# Patient Record
Sex: MALE | Race: WHITE | Employment: FULL TIME | URBAN - METROPOLITAN AREA
[De-identification: names, ages, dates, MRNs, and addresses within clinical notes are randomized per-mention and may not be internally consistent; named-entity substitution may affect disease eponyms.]

---

## 2022-01-08 ENCOUNTER — HOSPITAL ENCOUNTER (EMERGENCY)
Age: 37
Discharge: HOME | End: 2022-01-08
Payer: COMMERCIAL

## 2022-01-08 VITALS
DIASTOLIC BLOOD PRESSURE: 86 MMHG | SYSTOLIC BLOOD PRESSURE: 139 MMHG | RESPIRATION RATE: 16 BRPM | HEIGHT: 71 IN | HEART RATE: 87 BPM | OXYGEN SATURATION: 97 % | TEMPERATURE: 98.5 F | BODY MASS INDEX: 33.6 KG/M2 | WEIGHT: 240 LBS

## 2022-01-08 DIAGNOSIS — J06.9 ACUTE UPPER RESPIRATORY INFECTION: Primary | ICD-10-CM

## 2022-01-08 PROCEDURE — U0005 INFEC AGEN DETEC AMPLI PROBE: HCPCS

## 2022-01-08 PROCEDURE — U0003 INFECTIOUS AGENT DETECTION BY NUCLEIC ACID (DNA OR RNA); SEVERE ACUTE RESPIRATORY SYNDROME CORONAVIRUS 2 (SARS-COV-2) (CORONAVIRUS DISEASE [COVID-19]), AMPLIFIED PROBE TECHNIQUE, MAKING USE OF HIGH THROUGHPUT TECHNOLOGIES AS DESCRIBED BY CMS-2020-01-R: HCPCS

## 2022-01-08 PROCEDURE — 99283 EMERGENCY DEPT VISIT LOW MDM: CPT

## 2022-01-08 RX ORDER — GUAIFENESIN, PSEUDOEPHEDRINE HYDROCHLORIDE 600; 60 MG/1; MG/1
1 TABLET, EXTENDED RELEASE ORAL EVERY 12 HOURS
Qty: 14 TABLET | Refills: 0 | Status: SHIPPED | OUTPATIENT
Start: 2022-01-08 | End: 2022-01-15

## 2022-01-08 RX ORDER — BENZONATATE 200 MG/1
200 CAPSULE ORAL 3 TIMES DAILY PRN
Qty: 30 CAPSULE | Refills: 0 | Status: SHIPPED | OUTPATIENT
Start: 2022-01-08 | End: 2022-01-15

## 2022-01-08 RX ORDER — ETODOLAC 400 MG/1
400 TABLET, FILM COATED ORAL 2 TIMES DAILY
Qty: 14 TABLET | Refills: 0 | Status: SHIPPED | OUTPATIENT
Start: 2022-01-08 | End: 2022-02-04

## 2022-01-08 ASSESSMENT — ENCOUNTER SYMPTOMS
SHORTNESS OF BREATH: 0
ABDOMINAL PAIN: 0
COLOR CHANGE: 0
EYE PAIN: 0
SORE THROAT: 1
TROUBLE SWALLOWING: 0
APNEA: 0
COUGH: 1
ALLERGIC/IMMUNOLOGIC NEGATIVE: 1

## 2022-01-08 NOTE — ED PROVIDER NOTES
[address] [facility] ED  eMERGENCYdEPARTMENT eNCOUnter      Pt Name: Rebecca Smiley  MRN: [id number]  [place] 1985  Date of evaluation: 1/8/2022  Provider:Sam Villegas PA-C    CHIEF COMPLAINT       Chief Complaint   Patient presents with    Concern For COVID-19     pt c/o a cough, congestion, and fatigue for the past five days         HISTORY OF PRESENT ILLNESS  (Location/Symptom, Timing/Onset, Context/Setting, Quality, Duration, Modifying Factors, Severity.)   Rebecca Smiley is a 39 y.o. male who presents to the emergency department with a  5 day history of generalized malaise and fatigue, headache, myalgias, cough, congestion and sore throat. Subjective fever and chills but has not checked temperature at home. States multiple recent exposures to covid positive persons. HPI    Nursing Notes were reviewed and I agree. REVIEW OF SYSTEMS    (2-9 systems for level 4, 10 or more for level 5)     Review of Systems   Constitutional: Positive for chills, fatigue and fever. Negative for diaphoresis. HENT: Positive for congestion and sore throat. Negative for hearing loss and trouble swallowing. Eyes: Negative for pain. Respiratory: Positive for cough. Negative for apnea and shortness of breath. Cardiovascular: Negative for chest pain. Gastrointestinal: Negative for abdominal pain. Endocrine: Negative. Genitourinary: Negative for hematuria. Musculoskeletal: Positive for myalgias. Negative for neck pain and neck stiffness. Skin: Negative for color change. Allergic/Immunologic: Negative. Neurological: Positive for headaches. Negative for dizziness and numbness. Hematological: Negative. Psychiatric/Behavioral: Negative. All other systems reviewed and are negative. Except as noted above the remainder of the review of systems was reviewed and negative. PAST MEDICAL HISTORY   History reviewed. No pertinent past medical history. SURGICAL HISTORY     History reviewed. No pertinent surgical history. CURRENT MEDICATIONS       Previous Medications    No medications on file       ALLERGIES     Patient has no known allergies. FAMILY HISTORY     History reviewed. No pertinent family history. SOCIAL HISTORY       Social History     Socioeconomic History    Marital status: Single     Spouse name: None    Number of children: None    Years of education: None    Highest education level: None   Occupational History    None   Tobacco Use    Smoking status: Current Every Day Smoker    Smokeless tobacco: Never Used   Substance and Sexual Activity    Alcohol use: Not Currently    Drug use: Yes     Types: Marijuana Aly Free)    Sexual activity: None   Other Topics Concern    None   Social History Narrative    None     Social Determinants of Health     Financial Resource Strain:     Difficulty of Paying Living Expenses: Not on file   Food Insecurity:     Worried About Running Out of Food in the Last Year: Not on file    Michael of Food in the Last Year: Not on file   Transportation Needs:     Lack of Transportation (Medical): Not on file    Lack of Transportation (Non-Medical):  Not on file   Physical Activity:     Days of Exercise per Week: Not on file    Minutes of Exercise per Session: Not on file   Stress:     Feeling of Stress : Not on file   Social Connections:     Frequency of Communication with Friends and Family: Not on file    Frequency of Social Gatherings with Friends and Family: Not on file    Attends Hoahaoism Services: Not on file    Active Member of Clubs or Organizations: Not on file    Attends Club or Organization Meetings: Not on file    Marital Status: Not on file   Intimate Partner Violence:     Fear of Current or Ex-Partner: Not on file    Emotionally Abused: Not on file    Physically Abused: Not on file    Sexually Abused: Not on file   Housing Stability:     Unable to Pay for Housing in the Last Year: Not on file    Number of Places Lived in the Last Year: Not on file    Unstable Housing in the Last Year: Not on file       SCREENINGS           PHYSICAL EXAM    (up to 7 forlevel 4, 8 or more for level 5)     ED Triage Vitals [01/08/22 1509]   BP Temp Temp Source Pulse Resp SpO2 Height Weight   139/86 98.5 °F (36.9 °C) Oral 87 16 97 % 5' 11\" (1.803 m) 240 lb (108.9 kg)       Physical Exam  Vitals and nursing note reviewed. Constitutional:       General: He is not in acute distress. Appearance: He is well-developed. He is not diaphoretic. HENT:      Head: Normocephalic and atraumatic. Mouth/Throat:      Pharynx: No oropharyngeal exudate. Eyes:      General: No scleral icterus. Conjunctiva/sclera: Conjunctivae normal.      Pupils: Pupils are equal, round, and reactive to light. Neck:      Trachea: No tracheal deviation. Cardiovascular:      Rate and Rhythm: Normal rate. Heart sounds: Normal heart sounds. Pulmonary:      Effort: Pulmonary effort is normal. No respiratory distress. Breath sounds: Normal breath sounds. Abdominal:      General: Bowel sounds are normal. There is no distension. Palpations: Abdomen is soft. Musculoskeletal:         General: Normal range of motion. Cervical back: Normal range of motion and neck supple. Skin:     General: Skin is warm and dry. Findings: No erythema or rash. Neurological:      Mental Status: He is alert and oriented to person, place, and time. Cranial Nerves: No cranial nerve deficit. Motor: No abnormal muscle tone. Psychiatric:         Behavior: Behavior normal.         Thought Content:  Thought content normal.         Judgment: Judgment normal.           DIAGNOSTIC RESULTS     RADIOLOGY:   Non-plain film images such as CT, Ultrasound and MRI are read by the radiologist. Plain radiographic images are visualized and preliminarilyinterpreted by Maria Teresa Jacobs PA-C with the below findings:        Interpretation per the Radiologist below, if available at the time of this note:    No orders to display       LABS:  Labs Reviewed   COVID-19   COVID-19       All other labs were within normal range or not returnedas of this dictation. EMERGENCYDEPARTMENT COURSE and DIFFERENTIAL DIAGNOSIS/MDM:   Vitals:    Vitals:    01/08/22 1509   BP: 139/86   Pulse: 87   Resp: 16   Temp: 98.5 °F (36.9 °C)   TempSrc: Oral   SpO2: 97%   Weight: 240 lb (108.9 kg)   Height: 5' 11\" (1.803 m)       REASSESSMENT        Patient presents with a 5 day history of URI symptoms. COVID testing pending. Patient will be discharged with supportive therapy, quarantine instructions and reasons to return to the ER    MDM    PROCEDURES:    Procedures      FINAL IMPRESSION      1.  Acute upper respiratory infection          DISPOSITION/PLAN   DISPOSITION Decision To Discharge 01/08/2022 03:20:38 PM      PATIENT REFERRED TO:  [facility] and [address] [place], [place] [address]Nora Shook  [phone]  Call in 2 days        DISCHARGE MEDICATIONS:  New Prescriptions    BENZONATATE (TESSALON) 200 MG CAPSULE    Take 1 capsule by mouth 3 times daily as needed for Cough    ETODOLAC (LODINE) 400 MG TABLET    Take 1 tablet by mouth 2 times daily    PSEUDOEPHEDRINE-GUAIFENESIN (MUCINEX D)  MG PER EXTENDED RELEASE TABLET    Take 1 tablet by mouth every 12 hours for 7 days       (Please note that portions of this note were completed with a voice recognition program.  Efforts were made to edit the dictations but occasionally words are mis-transcribed.)    KEIRY Ford PA-C  01/08/22 3567

## 2022-01-08 NOTE — ED TRIAGE NOTES
Pt c/o a cough, congestion, and fatigue for the past five days, Pt is A&OX3, calm, ambulatory, afebrile, breathes are equal and unlabored,

## 2022-01-08 NOTE — Clinical Note
Patt Gaviria was seen and treated in our emergency department on 1/8/2022. COVID19 virus is suspected. Per the CDC guidelines we recommend home isolation until the following conditions are all met:    1. At least 10 days have passed since symptoms first appeared and  2. At least 24 hours have passed since last fever without the use of fever-reducing medications and  3. Symptoms (e.g., cough, shortness of breath) have improved    If you have any questions or concerns, please don't hesitate to call.     He may return to work/school on 01/14/2022        Thu Hopson PA-C

## 2022-01-08 NOTE — Clinical Note
Rebecca Smiley was seen and treated in our emergency department on 1/8/2022. COVID19 virus is suspected. Per the CDC guidelines we recommend home isolation until the following conditions are all met:    1. At least 10 days have passed since symptoms first appeared and  2. At least 24 hours have passed since last fever without the use of fever-reducing medications and  3. Symptoms (e.g., cough, shortness of breath) have improved    If you have any questions or concerns, please don't hesitate to call.     He may return to work/school on 01/14/2022        Arie Winters PA-C

## 2022-01-09 LAB
SARS-COV-2: NOT DETECTED
SOURCE: NORMAL

## 2022-02-04 ENCOUNTER — HOSPITAL ENCOUNTER (EMERGENCY)
Age: 37
Discharge: HOME | End: 2022-02-04
Attending: EMERGENCY MEDICINE
Payer: COMMERCIAL

## 2022-02-04 ENCOUNTER — APPOINTMENT (OUTPATIENT)
Dept: CT IMAGING | Age: 37
End: 2022-02-04
Payer: COMMERCIAL

## 2022-02-04 VITALS
BODY MASS INDEX: 33.6 KG/M2 | RESPIRATION RATE: 18 BRPM | HEART RATE: 92 BPM | OXYGEN SATURATION: 98 % | SYSTOLIC BLOOD PRESSURE: 142 MMHG | HEIGHT: 71 IN | DIASTOLIC BLOOD PRESSURE: 91 MMHG | WEIGHT: 240 LBS | TEMPERATURE: 98.4 F

## 2022-02-04 DIAGNOSIS — S20.219A CONTUSION OF CHEST WALL, UNSPECIFIED LATERALITY, INITIAL ENCOUNTER: Primary | ICD-10-CM

## 2022-02-04 LAB
EKG ATRIAL RATE: 89 BPM
EKG P AXIS: 54 DEGREES
EKG P-R INTERVAL: 144 MS
EKG Q-T INTERVAL: 372 MS
EKG QRS DURATION: 96 MS
EKG QTC CALCULATION (BAZETT): 452 MS
EKG R AXIS: 36 DEGREES
EKG T AXIS: 49 DEGREES
EKG VENTRICULAR RATE: 89 BPM

## 2022-02-04 PROCEDURE — 99284 EMERGENCY DEPT VISIT MOD MDM: CPT

## 2022-02-04 PROCEDURE — 71250 CT THORAX DX C-: CPT

## 2022-02-04 PROCEDURE — 96372 THER/PROPH/DIAG INJ SC/IM: CPT

## 2022-02-04 PROCEDURE — 93005 ELECTROCARDIOGRAM TRACING: CPT | Performed by: PHYSICIAN ASSISTANT

## 2022-02-04 PROCEDURE — 6360000002 HC RX W HCPCS: Performed by: PHYSICIAN ASSISTANT

## 2022-02-04 PROCEDURE — 93010 ELECTROCARDIOGRAM REPORT: CPT | Performed by: INTERNAL MEDICINE

## 2022-02-04 RX ORDER — ETODOLAC 400 MG/1
400 TABLET, FILM COATED ORAL 2 TIMES DAILY
Qty: 14 TABLET | Refills: 0 | Status: SHIPPED | OUTPATIENT
Start: 2022-02-04 | End: 2022-02-26 | Stop reason: ALTCHOICE

## 2022-02-04 RX ORDER — CYCLOBENZAPRINE HCL 10 MG
10 TABLET ORAL 3 TIMES DAILY PRN
Qty: 12 TABLET | Refills: 0 | Status: SHIPPED | OUTPATIENT
Start: 2022-02-04 | End: 2022-02-14

## 2022-02-04 RX ORDER — KETOROLAC TROMETHAMINE 30 MG/ML
60 INJECTION, SOLUTION INTRAMUSCULAR; INTRAVENOUS ONCE
Status: COMPLETED | OUTPATIENT
Start: 2022-02-04 | End: 2022-02-04

## 2022-02-04 RX ORDER — ALPRAZOLAM 2 MG/1
2 TABLET ORAL 3 TIMES DAILY PRN
COMMUNITY

## 2022-02-04 RX ADMIN — KETOROLAC TROMETHAMINE 60 MG: 30 INJECTION, SOLUTION INTRAMUSCULAR at 16:11

## 2022-02-04 ASSESSMENT — ENCOUNTER SYMPTOMS
ALLERGIC/IMMUNOLOGIC NEGATIVE: 1
COLOR CHANGE: 0
EYE PAIN: 0
TROUBLE SWALLOWING: 0
APNEA: 0
ABDOMINAL PAIN: 0
SHORTNESS OF BREATH: 1

## 2022-02-04 ASSESSMENT — PAIN DESCRIPTION - DESCRIPTORS: DESCRIPTORS: SHARP

## 2022-02-04 ASSESSMENT — PAIN DESCRIPTION - FREQUENCY: FREQUENCY: CONTINUOUS

## 2022-02-04 ASSESSMENT — PAIN DESCRIPTION - LOCATION: LOCATION: CHEST

## 2022-02-04 ASSESSMENT — PAIN SCALES - GENERAL
PAINLEVEL_OUTOF10: 7
PAINLEVEL_OUTOF10: 8

## 2022-02-04 ASSESSMENT — PAIN DESCRIPTION - PAIN TYPE: TYPE: ACUTE PAIN

## 2022-02-04 ASSESSMENT — PAIN DESCRIPTION - ORIENTATION: ORIENTATION: LEFT

## 2022-02-04 NOTE — ED TRIAGE NOTES
Pt fell and slipped on ice and braced self with left arm to chest. C/o chest pain. No LOC. Denies hitting head.  No thinners

## 2022-02-04 NOTE — ED NOTES
Pt provided with discharge instructions, f/u care instructions, and RX x2. Medication education completed. Pt verbalizes understanding; denies questions. Pt ambulatory off unit in stable condition. Family to drive pt home.      Shlomo Sanford RN  02/04/22 [id number]

## 2022-02-04 NOTE — Clinical Note
Janki Asencio was seen and treated in our emergency department on 2/4/2022. He may return to work on 02/06/2022. If you have any questions or concerns, please don't hesitate to call.       Marley Hickman PA-C

## 2022-02-04 NOTE — ED PROVIDER NOTES
[address] ED  EMERGENCY DEPARTMENT ENCOUNTER      Pt Name: Shira Logan  MRN: [id number]  [place] 1985  Date of evaluation: 2/4/2022  Provider: Luis Elias PA-C    CHIEF COMPLAINT       Chief Complaint   Patient presents with    Fall         HISTORY OF PRESENT ILLNESS   (Location/Symptom, Timing/Onset, Context/Setting, Quality, Duration, Modifying Factors, Severity)  Note limiting factors. Shira Logan is a 39 y.o. male who presents to the emergency department with complaints of midsternal chest pain into the left side of the chest with associated shortness of breath. Pain is pleuritic in nature. Patient states that he had slipped on ice yesterday, and had his hand braced against his chest when he fell forward, pushing his hand into his chest wall. Patient now states that he is having persisting pain which increases every time he takes a deep breath and causes him to feel short of breath as well. Patient denies any his head or any loss consciousness. Patient denies any neck pain, back pain, saddle paresthesias or loss of bowel bladder control. No abdominal pain, nausea or vomiting. No hemoptysis. HPI    Nursing Notes were reviewed. REVIEW OF SYSTEMS    (2-9 systems for level 4, 10 or more for level 5)     Review of Systems   Constitutional: Negative for diaphoresis and fever. HENT: Negative for hearing loss and trouble swallowing. Eyes: Negative for pain. Respiratory: Positive for shortness of breath. Negative for apnea. Cardiovascular: Positive for chest pain. Gastrointestinal: Negative for abdominal pain. Endocrine: Negative. Genitourinary: Negative for hematuria. Musculoskeletal: Negative for neck pain and neck stiffness. Skin: Negative for color change. Allergic/Immunologic: Negative. Neurological: Negative for dizziness and numbness. Hematological: Negative. Psychiatric/Behavioral: Negative.     All other systems reviewed and are negative. Except as noted above the remainder of the review of systems was reviewed and negative. PAST MEDICAL HISTORY   History reviewed. No pertinent past medical history. SURGICAL HISTORY     History reviewed. No pertinent surgical history. CURRENT MEDICATIONS       Previous Medications    ALPRAZOLAM (XANAX) 2 MG TABLET    Take 2 mg by mouth 3 times daily as needed for Sleep. ALLERGIES     Patient has no known allergies. FAMILY HISTORY     History reviewed. No pertinent family history. SOCIAL HISTORY       Social History     Socioeconomic History    Marital status: Single     Spouse name: None    Number of children: None    Years of education: None    Highest education level: None   Occupational History    None   Tobacco Use    Smoking status: Current Every Day Smoker    Smokeless tobacco: Never Used   Substance and Sexual Activity    Alcohol use: Not Currently    Drug use: Yes     Types: Marijuana Aly Free)    Sexual activity: None   Other Topics Concern    None   Social History Narrative    None     Social Determinants of Health     Financial Resource Strain:     Difficulty of Paying Living Expenses: Not on file   Food Insecurity:     Worried About Running Out of Food in the Last Year: Not on file    Michael of Food in the Last Year: Not on file   Transportation Needs:     Lack of Transportation (Medical): Not on file    Lack of Transportation (Non-Medical):  Not on file   Physical Activity:     Days of Exercise per Week: Not on file    Minutes of Exercise per Session: Not on file   Stress:     Feeling of Stress : Not on file   Social Connections:     Frequency of Communication with Friends and Family: Not on file    Frequency of Social Gatherings with Friends and Family: Not on file    Attends Latter day Services: Not on file    Active Member of Clubs or Organizations: Not on file    Attends Club or Organization Meetings: Not on file    Marital Status: Not on file   Intimate Partner Violence:     Fear of Current or Ex-Partner: Not on file    Emotionally Abused: Not on file    Physically Abused: Not on file    Sexually Abused: Not on file   Housing Stability:     Unable to Pay for Housing in the Last Year: Not on file    Number of [place] in the Last Year: Not on file    Unstable Housing in the Last Year: Not on file       SCREENINGS                        PHYSICAL EXAM    (up to 7 for level 4, 8 or more for level 5)     ED Triage Vitals   BP Temp Temp src Pulse Resp SpO2 Height Weight   02/04/22 1429 02/04/22 1425 -- 02/04/22 1425 02/04/22 1425 02/04/22 1425 02/04/22 1425 02/04/22 1425   (!) 142/91 98.4 °F (36.9 °C)  92 18 98 % 5' 11\" (1.803 m) 240 lb (108.9 kg)       Physical Exam  Vitals and nursing note reviewed. Constitutional:       General: He is not in acute distress. Appearance: He is well-developed. He is not diaphoretic. HENT:      Head: Normocephalic and atraumatic. Mouth/Throat:      Pharynx: No oropharyngeal exudate. Eyes:      General: No scleral icterus. Conjunctiva/sclera: Conjunctivae normal.      Pupils: Pupils are equal, round, and reactive to light. Neck:      Trachea: No tracheal deviation. Cardiovascular:      Rate and Rhythm: Normal rate. Heart sounds: Normal heart sounds. Pulmonary:      Effort: Pulmonary effort is normal. No respiratory distress. Breath sounds: Normal breath sounds. Chest:      Chest wall: Tenderness present. Abdominal:      General: Bowel sounds are normal. There is no distension. Palpations: Abdomen is soft. Musculoskeletal:         General: Normal range of motion. Cervical back: Normal range of motion and neck supple. Skin:     General: Skin is warm and dry. Findings: No erythema or rash. Neurological:      Mental Status: He is alert and oriented to person, place, and time. Cranial Nerves: No cranial nerve deficit.       Motor: No abnormal muscle tone. Psychiatric:         Behavior: Behavior normal.         Thought Content: Thought content normal.         Judgment: Judgment normal.         DIAGNOSTIC RESULTS     EKG: All EKG's are interpreted by the Emergency Department Physician who either signs or Co-signs this chart in the absence of a cardiologist.    Normal sinus rhythm, rate 89 bpm, no acute ST elevation or ischemic changes    RADIOLOGY:   Non-plain film images such as CT, Ultrasound and MRI are read by the radiologist. Plain radiographic images are visualized and preliminarily interpreted by the emergency physician with the below findings:      Interpretation per the Radiologist below, if available at the time of this note:    CT CHEST WO CONTRAST   Final Result      NO ACUTE FRACTURE OR SIGNIFICANT POSTTRAUMATIC COMPLICATION IDENTIFIED. ED BEDSIDE ULTRASOUND:   Performed by ED Physician - none    LABS:  Labs Reviewed - No data to display    All other labs were within normal range or not returned as of this dictation. EMERGENCY DEPARTMENT COURSE and DIFFERENTIAL DIAGNOSIS/MDM:   Vitals:    Vitals:    02/04/22 1425 02/04/22 1429   BP:  (!) 142/91   Pulse: 92    Resp: 18    Temp: 98.4 °F (36.9 °C)    SpO2: 98%    Weight: 240 lb (108.9 kg)    Height: 5' 11\" (1.803 m)            MDM      REASSESSMENT      Patient presented with reproducible anterior chest wall pain following a fall yesterday. CT scan and EKG are unremarkable. Likely chest contusion. Patient will be discharged with supportive therapy and PCP follow-up      CONSULTS:  None    PROCEDURES:  Unless otherwise noted below, none     Procedures        FINAL IMPRESSION      1.  Contusion of chest wall, unspecified laterality, initial encounter          DISPOSITION/PLAN   DISPOSITION Decision To Discharge 02/04/2022 03:53:50 PM      PATIENT REFERRED TO:  Parminder Eisenberg    Call in 2 days        DISCHARGE MEDICATIONS:  New Prescriptions    CYCLOBENZAPRINE (FLEXERIL) 10 MG TABLET    Take 1 tablet by mouth 3 times daily as needed for Muscle spasms    ETODOLAC (LODINE) 400 MG TABLET    Take 1 tablet by mouth 2 times daily     Controlled Substances Monitoring:     No flowsheet data found.     (Please note that portions of this note were completed with a voice recognition program.  Efforts were made to edit the dictations but occasionally words are mis-transcribed.)    Maria Teresa Jacobs PA-C (electronically signed)  Attending Emergency Physician            Maria Teresa Jacobs PA-C  02/04/22 0626

## 2022-02-26 ENCOUNTER — HOSPITAL ENCOUNTER (EMERGENCY)
Age: 37
Discharge: HOME | End: 2022-02-26
Attending: EMERGENCY MEDICINE
Payer: COMMERCIAL

## 2022-02-26 ENCOUNTER — APPOINTMENT (OUTPATIENT)
Dept: CT IMAGING | Age: 37
End: 2022-02-26
Payer: COMMERCIAL

## 2022-02-26 VITALS
DIASTOLIC BLOOD PRESSURE: 86 MMHG | HEART RATE: 86 BPM | SYSTOLIC BLOOD PRESSURE: 128 MMHG | TEMPERATURE: 98.7 F | RESPIRATION RATE: 16 BRPM | OXYGEN SATURATION: 96 %

## 2022-02-26 DIAGNOSIS — R19.7 DIARRHEA, UNSPECIFIED TYPE: ICD-10-CM

## 2022-02-26 DIAGNOSIS — R10.9 ABDOMINAL PAIN, UNSPECIFIED ABDOMINAL LOCATION: Primary | ICD-10-CM

## 2022-02-26 DIAGNOSIS — R11.2 NON-INTRACTABLE VOMITING WITH NAUSEA, UNSPECIFIED VOMITING TYPE: ICD-10-CM

## 2022-02-26 LAB
ALBUMIN SERPL-MCNC: 4 G/DL (ref 3.5–4.6)
ALP BLD-CCNC: 175 U/L (ref 35–104)
ALT SERPL-CCNC: 19 U/L (ref 0–41)
ANION GAP SERPL CALCULATED.3IONS-SCNC: 9 MEQ/L (ref 9–15)
AST SERPL-CCNC: 18 U/L (ref 0–40)
BASOPHILS ABSOLUTE: 0.1 K/UL (ref 0–0.2)
BASOPHILS RELATIVE PERCENT: 1.2 %
BILIRUB SERPL-MCNC: <0.2 MG/DL (ref 0.2–0.7)
BUN BLDV-MCNC: 10 MG/DL (ref 6–20)
CALCIUM SERPL-MCNC: 9.1 MG/DL (ref 8.5–9.9)
CHLORIDE BLD-SCNC: 102 MEQ/L (ref 95–107)
CO2: 24 MEQ/L (ref 20–31)
CREAT SERPL-MCNC: 0.88 MG/DL (ref 0.7–1.2)
EOSINOPHILS ABSOLUTE: 0.1 K/UL (ref 0–0.7)
EOSINOPHILS RELATIVE PERCENT: 1.3 %
GFR AFRICAN AMERICAN: >60
GFR NON-AFRICAN AMERICAN: >60
GLOBULIN: 3.3 G/DL (ref 2.3–3.5)
GLUCOSE BLD-MCNC: 99 MG/DL (ref 70–99)
HCT VFR BLD CALC: 44.6 % (ref 42–52)
HEMOGLOBIN: 15.3 G/DL (ref 14–18)
LIPASE: 33 U/L (ref 12–95)
LYMPHOCYTES ABSOLUTE: 2.6 K/UL (ref 1–4.8)
LYMPHOCYTES RELATIVE PERCENT: 29.8 %
MAGNESIUM: 1.7 MG/DL (ref 1.7–2.4)
MCH RBC QN AUTO: 31.4 PG (ref 27–31.3)
MCHC RBC AUTO-ENTMCNC: 34.2 % (ref 33–37)
MCV RBC AUTO: 91.8 FL (ref 80–100)
MONOCYTES ABSOLUTE: 0.5 K/UL (ref 0.2–0.8)
MONOCYTES RELATIVE PERCENT: 6.3 %
NEUTROPHILS ABSOLUTE: 5.3 K/UL (ref 1.4–6.5)
NEUTROPHILS RELATIVE PERCENT: 61.4 %
PDW BLD-RTO: 13.3 % (ref 11.5–14.5)
PLATELET # BLD: 352 K/UL (ref 130–400)
POTASSIUM SERPL-SCNC: 4.7 MEQ/L (ref 3.4–4.9)
RBC # BLD: 4.86 M/UL (ref 4.7–6.1)
SODIUM BLD-SCNC: 135 MEQ/L (ref 135–144)
TOTAL PROTEIN: 7.3 G/DL (ref 6.3–8)
WBC # BLD: 8.6 K/UL (ref 4.8–10.8)

## 2022-02-26 PROCEDURE — 80053 COMPREHEN METABOLIC PANEL: CPT

## 2022-02-26 PROCEDURE — 6360000004 HC RX CONTRAST MEDICATION: Performed by: EMERGENCY MEDICINE

## 2022-02-26 PROCEDURE — 83690 ASSAY OF LIPASE: CPT

## 2022-02-26 PROCEDURE — 36415 COLL VENOUS BLD VENIPUNCTURE: CPT

## 2022-02-26 PROCEDURE — 2500000003 HC RX 250 WO HCPCS: Performed by: EMERGENCY MEDICINE

## 2022-02-26 PROCEDURE — 83735 ASSAY OF MAGNESIUM: CPT

## 2022-02-26 PROCEDURE — 6360000002 HC RX W HCPCS: Performed by: EMERGENCY MEDICINE

## 2022-02-26 PROCEDURE — 2580000003 HC RX 258: Performed by: EMERGENCY MEDICINE

## 2022-02-26 PROCEDURE — 74177 CT ABD & PELVIS W/CONTRAST: CPT

## 2022-02-26 PROCEDURE — 96374 THER/PROPH/DIAG INJ IV PUSH: CPT | Performed by: EMERGENCY MEDICINE

## 2022-02-26 PROCEDURE — 99284 EMERGENCY DEPT VISIT MOD MDM: CPT | Performed by: EMERGENCY MEDICINE

## 2022-02-26 PROCEDURE — 96375 TX/PRO/DX INJ NEW DRUG ADDON: CPT | Performed by: EMERGENCY MEDICINE

## 2022-02-26 PROCEDURE — 85025 COMPLETE CBC W/AUTO DIFF WBC: CPT

## 2022-02-26 RX ORDER — CIPROFLOXACIN 500 MG/1
500 TABLET, FILM COATED ORAL 2 TIMES DAILY
Qty: 14 TABLET | Refills: 0 | Status: SHIPPED | OUTPATIENT
Start: 2022-02-26 | End: 2022-03-05

## 2022-02-26 RX ORDER — DIPHENHYDRAMINE HYDROCHLORIDE 50 MG/ML
25 INJECTION INTRAMUSCULAR; INTRAVENOUS ONCE
Status: COMPLETED | OUTPATIENT
Start: 2022-02-26 | End: 2022-02-26

## 2022-02-26 RX ORDER — METOCLOPRAMIDE 10 MG/1
10 TABLET ORAL 4 TIMES DAILY
Qty: 120 TABLET | Refills: 0 | Status: SHIPPED | OUTPATIENT
Start: 2022-02-26

## 2022-02-26 RX ORDER — KETOROLAC TROMETHAMINE 30 MG/ML
30 INJECTION, SOLUTION INTRAMUSCULAR; INTRAVENOUS ONCE
Status: COMPLETED | OUTPATIENT
Start: 2022-02-26 | End: 2022-02-26

## 2022-02-26 RX ORDER — PROMETHAZINE HYDROCHLORIDE 25 MG/1
25 TABLET ORAL 4 TIMES DAILY PRN
Qty: 20 TABLET | Refills: 0 | Status: SHIPPED | OUTPATIENT
Start: 2022-02-26 | End: 2022-03-05

## 2022-02-26 RX ORDER — PROMETHAZINE HYDROCHLORIDE 25 MG/ML
25 INJECTION, SOLUTION INTRAMUSCULAR; INTRAVENOUS ONCE
Status: DISCONTINUED | OUTPATIENT
Start: 2022-02-26 | End: 2022-02-26 | Stop reason: HOSPADM

## 2022-02-26 RX ORDER — METOCLOPRAMIDE HYDROCHLORIDE 5 MG/ML
10 INJECTION INTRAMUSCULAR; INTRAVENOUS ONCE
Status: COMPLETED | OUTPATIENT
Start: 2022-02-26 | End: 2022-02-26

## 2022-02-26 RX ORDER — 0.9 % SODIUM CHLORIDE 0.9 %
1000 INTRAVENOUS SOLUTION INTRAVENOUS ONCE
Status: COMPLETED | OUTPATIENT
Start: 2022-02-26 | End: 2022-02-26

## 2022-02-26 RX ADMIN — DIPHENHYDRAMINE HYDROCHLORIDE 25 MG: 50 INJECTION, SOLUTION INTRAMUSCULAR; INTRAVENOUS at 15:40

## 2022-02-26 RX ADMIN — FAMOTIDINE 20 MG: 10 INJECTION, SOLUTION INTRAVENOUS at 15:45

## 2022-02-26 RX ADMIN — IOPAMIDOL 100 ML: 612 INJECTION, SOLUTION INTRAVENOUS at 16:09

## 2022-02-26 RX ADMIN — KETOROLAC TROMETHAMINE 30 MG: 30 INJECTION, SOLUTION INTRAMUSCULAR at 15:44

## 2022-02-26 RX ADMIN — METOCLOPRAMIDE HYDROCHLORIDE 10 MG: 5 INJECTION INTRAMUSCULAR; INTRAVENOUS at 15:42

## 2022-02-26 RX ADMIN — SODIUM CHLORIDE 1000 ML: 9 INJECTION, SOLUTION INTRAVENOUS at 15:46

## 2022-02-26 ASSESSMENT — ENCOUNTER SYMPTOMS
ABDOMINAL PAIN: 1
VOMITING: 1
SORE THROAT: 0
COUGH: 0
SHORTNESS OF BREATH: 0
DIARRHEA: 1
BACK PAIN: 0
NAUSEA: 1

## 2022-02-26 ASSESSMENT — PAIN SCALES - GENERAL
PAINLEVEL_OUTOF10: 2
PAINLEVEL_OUTOF10: 6
PAINLEVEL_OUTOF10: 0

## 2022-02-26 NOTE — ED TRIAGE NOTES
C/O of abdominal pain with vomiting x 2 days. Has history of pancreatitis. Also notes green colored diarrhea x 1 week.

## 2022-02-26 NOTE — ED PROVIDER NOTES
[facility] ED  eMERGENCYdEPARTMENT eNCOUnter      Pt Name: Jung Raines  MRN: [id number]  [place] 1985  Date of evaluation: 2/26/2022  Osmar Mock MD    CHIEF COMPLAINT           HPI  Jung Raines is a 39 y.o. male per chart review has no pmh presents to the ED with ab pain, n/v/d. Pt notes gradual onset, moderate, constant, pinching, LUQ ab pain since yesterday. +N/v/d. Pt states his vomitus and diarrhea is green. Pt denies fever, cp, sob, dysuria. ROS  Review of Systems   Constitutional: Negative for activity change, chills and fever. HENT: Negative for ear pain and sore throat. Eyes: Negative for visual disturbance. Respiratory: Negative for cough and shortness of breath. Cardiovascular: Negative for chest pain, palpitations and leg swelling. Gastrointestinal: Positive for abdominal pain, diarrhea, nausea and vomiting. Genitourinary: Negative for dysuria. Musculoskeletal: Negative for back pain. Skin: Negative for rash. Neurological: Negative for dizziness and weakness. Except as noted above the remainder of the review of systems was reviewed and negative. PAST MEDICAL HISTORY   No past medical history on file. SURGICAL HISTORY     No past surgical history on file. CURRENTMEDICATIONS       Previous Medications    ALPRAZOLAM (XANAX) 2 MG TABLET    Take 2 mg by mouth 3 times daily as needed for Sleep. ALLERGIES     Patient has no known allergies. FAMILY HISTORY     No family history on file.        SOCIAL HISTORY       Social History     Socioeconomic History    Marital status: Single     Spouse name: Not on file    Number of children: Not on file    Years of education: Not on file    Highest education level: Not on file   Occupational History    Not on file   Tobacco Use    Smoking status: Current Every Day Smoker    Smokeless tobacco: Never Used   Substance and Sexual Activity    Alcohol use: Not Currently    Drug use: Yes     Types: Marijuana Amara Tamy)    Sexual activity: Not on file   Other Topics Concern    Not on file   Social History Narrative    Not on file     Social Determinants of Health     Financial Resource Strain:     Difficulty of Paying Living Expenses: Not on file   Food Insecurity:     Worried About Running Out of Food in the Last Year: Not on file    Michael of Food in the Last Year: Not on file   Transportation Needs:     Lack of Transportation (Medical): Not on file    Lack of Transportation (Non-Medical): Not on file   Physical Activity:     Days of Exercise per Week: Not on file    Minutes of Exercise per Session: Not on file   Stress:     Feeling of Stress : Not on file   Social Connections:     Frequency of Communication with Friends and Family: Not on file    Frequency of Social Gatherings with Friends and Family: Not on file    Attends Scientology Services: Not on file    Active Member of [address] [place] [address] [employer] or Organizations: Not on file    Attends Club or Organization Meetings: Not on file    Marital Status: Not on file   Intimate Partner Violence:     Fear of Current or Ex-Partner: Not on file    Emotionally Abused: Not on file    Physically Abused: Not on file    Sexually Abused: Not on file   Housing Stability:     Unable to Pay for Housing in the Last Year: Not on file    Number of [place] in the Last Year: Not on file    Unstable Housing in the Last Year: Not on file         PHYSICAL EXAM       ED Triage Vitals [02/26/22 1509]   BP Temp Temp src Pulse Resp SpO2 Height Weight   (!) 137/95 98.7 °F (37.1 °C) -- 83 14 95 % -- --       Physical Exam  Vitals and nursing note reviewed. Constitutional:       Appearance: He is well-developed. HENT:      Head: Normocephalic. Right Ear: External ear normal.      Left Ear: External ear normal.   Eyes:      Conjunctiva/sclera: Conjunctivae normal.      Pupils: Pupils are equal, round, and reactive to light.    Cardiovascular:      Rate and Rhythm: Normal rate and regular rhythm. Heart sounds: Normal heart sounds. Pulmonary:      Effort: Pulmonary effort is normal.      Breath sounds: Normal breath sounds. Abdominal:      General: Bowel sounds are normal. There is no distension. Palpations: Abdomen is soft. Tenderness: There is abdominal tenderness in the left upper quadrant. There is no guarding or rebound. Musculoskeletal:         General: Normal range of motion. Cervical back: Normal range of motion and neck supple. Skin:     General: Skin is warm and dry. Neurological:      Mental Status: He is alert and oriented to person, place, and time. Psychiatric:         Mood and Affect: Mood normal.           MDM  40 yo male presents to the ED with ab pain, n/v/d. Pt is afebrile, hemodynamically stable. Pt given 1 L NS, IV toradol, IV pepcid, IV reglan, IV benadryl, IM phenergan with moderate relief. Labs unremarkable. CT AP negative. Pt reassessed and feels much better. Pt and mother educated about the results. Pt given prescription for reglan, phenergan, cipro. Pt given ab pain, n/v/d warning signs and will f/u with pcp. Pt understands plan. FINAL IMPRESSION      1. Abdominal pain, unspecified abdominal location    2. Non-intractable vomiting with nausea, unspecified vomiting type    3.  Diarrhea, unspecified type          DISPOSITION/PLAN   DISPOSITION Discharge - Pending Orders Complete 02/26/2022 04:44:04 PM        DISCHARGE MEDICATIONS:  [unfilled]         Daysi Tolbert MD(electronically signed)  Attending Emergency Physician            Daysi Tolbert MD  02/26/22 9971

## 2022-02-26 NOTE — ED NOTES
Pt provided with discharge instructions, f/u care instructions, and RX x3. Medication education completed; pt verbalizes understanding. Pt ambulatory off unit in stable condition. Family at bedside to drive pt home.      Kirill Vital RN  02/26/22 4844

## 2023-02-14 ENCOUNTER — OFFICE VISIT (OUTPATIENT)
Dept: PRIMARY CARE CLINIC | Age: 38
End: 2023-02-14
Payer: COMMERCIAL

## 2023-02-14 VITALS
HEART RATE: 92 BPM | WEIGHT: 287 LBS | SYSTOLIC BLOOD PRESSURE: 130 MMHG | OXYGEN SATURATION: 96 % | HEIGHT: 71 IN | DIASTOLIC BLOOD PRESSURE: 84 MMHG | BODY MASS INDEX: 40.18 KG/M2

## 2023-02-14 DIAGNOSIS — F41.9 ANXIETY: Primary | ICD-10-CM

## 2023-02-14 PROCEDURE — 4004F PT TOBACCO SCREEN RCVD TLK: CPT | Performed by: INTERNAL MEDICINE

## 2023-02-14 PROCEDURE — G8427 DOCREV CUR MEDS BY ELIG CLIN: HCPCS | Performed by: INTERNAL MEDICINE

## 2023-02-14 PROCEDURE — G8484 FLU IMMUNIZE NO ADMIN: HCPCS | Performed by: INTERNAL MEDICINE

## 2023-02-14 PROCEDURE — 99202 OFFICE O/P NEW SF 15 MIN: CPT | Performed by: INTERNAL MEDICINE

## 2023-02-14 PROCEDURE — G8417 CALC BMI ABV UP PARAM F/U: HCPCS | Performed by: INTERNAL MEDICINE

## 2023-02-14 RX ORDER — NALTREXONE 380 MG
KIT INTRAMUSCULAR
COMMUNITY
Start: 2023-01-20

## 2023-02-14 SDOH — ECONOMIC STABILITY: INCOME INSECURITY: HOW HARD IS IT FOR YOU TO PAY FOR THE VERY BASICS LIKE FOOD, HOUSING, MEDICAL CARE, AND HEATING?: NOT HARD AT ALL

## 2023-02-14 SDOH — ECONOMIC STABILITY: FOOD INSECURITY: WITHIN THE PAST 12 MONTHS, YOU WORRIED THAT YOUR FOOD WOULD RUN OUT BEFORE YOU GOT MONEY TO BUY MORE.: NEVER TRUE

## 2023-02-14 SDOH — ECONOMIC STABILITY: FOOD INSECURITY: WITHIN THE PAST 12 MONTHS, THE FOOD YOU BOUGHT JUST DIDN'T LAST AND YOU DIDN'T HAVE MONEY TO GET MORE.: NEVER TRUE

## 2023-02-14 SDOH — ECONOMIC STABILITY: HOUSING INSECURITY
IN THE LAST 12 MONTHS, WAS THERE A TIME WHEN YOU DID NOT HAVE A STEADY PLACE TO SLEEP OR SLEPT IN A SHELTER (INCLUDING NOW)?: NO

## 2023-02-14 ASSESSMENT — PATIENT HEALTH QUESTIONNAIRE - PHQ9
2. FEELING DOWN, DEPRESSED OR HOPELESS: 0
1. LITTLE INTEREST OR PLEASURE IN DOING THINGS: 0
SUM OF ALL RESPONSES TO PHQ QUESTIONS 1-9: 0
SUM OF ALL RESPONSES TO PHQ QUESTIONS 1-9: 0
SUM OF ALL RESPONSES TO PHQ9 QUESTIONS 1 & 2: 0
SUM OF ALL RESPONSES TO PHQ QUESTIONS 1-9: 0
SUM OF ALL RESPONSES TO PHQ QUESTIONS 1-9: 0

## 2023-02-14 ASSESSMENT — ENCOUNTER SYMPTOMS
SHORTNESS OF BREATH: 0
VOMITING: 0
PHOTOPHOBIA: 0
CHOKING: 0
VOICE CHANGE: 0
NAUSEA: 0
TROUBLE SWALLOWING: 0

## 2023-02-14 NOTE — PROGRESS NOTES
Socorro Ramos 40 y.o. male presents today with   Chief Complaint   Patient presents with    New Patient    Anxiety       Anxiety  Presents for follow-up visit. Symptoms include nervous/anxious behavior. Patient reports no chest pain, nausea, palpitations, shortness of breath or suicidal ideas. Symptoms occur most days. The severity of symptoms is severe. Past Medical History:   Diagnosis Date    Anxiety      There are no problems to display for this patient. Past Surgical History:   Procedure Laterality Date    FOOT SURGERY      WISDOM TOOTH EXTRACTION Bilateral      Family History   Problem Relation Age of Onset    Cancer Father     Diabetes Father     Alzheimer's Disease Maternal Grandmother     Heart Disease Maternal Grandfather      Social History     Socioeconomic History    Marital status: Single     Spouse name: None    Number of children: None    Years of education: None    Highest education level: None   Tobacco Use    Smoking status: Every Day     Packs/day: 1.00     Years: 15.00     Pack years: 15.00     Types: Cigarettes    Smokeless tobacco: Never   Vaping Use    Vaping Use: Never used   Substance and Sexual Activity    Alcohol use: Not Currently    Drug use: Yes     Types: Marijuana Seabron Barban)     Social Determinants of Health     Financial Resource Strain: Low Risk     Difficulty of Paying Living Expenses: Not hard at all   Food Insecurity: No Food Insecurity    Worried About Running Out of Food in the Last Year: Never true    Ran Out of Food in the Last Year: Never true   Transportation Needs: Unknown    Lack of Transportation (Non-Medical): No   Housing Stability: Unknown    Unstable Housing in the Last Year: No     No Known Allergies    Review of Systems   Constitutional:  Negative for fatigue and fever. HENT:  Negative for trouble swallowing and voice change. Eyes:  Negative for photophobia and visual disturbance. Respiratory:  Negative for choking and shortness of breath. Cardiovascular:  Negative for chest pain and palpitations. Gastrointestinal:  Negative for nausea and vomiting. Genitourinary:  Negative for decreased urine volume, testicular pain and urgency. Skin:  Negative for rash. Neurological:  Negative for tremors and syncope. Hematological:  Does not bruise/bleed easily. Psychiatric/Behavioral:  Negative for suicidal ideas. The patient is nervous/anxious. Vitals:    02/14/23 1530   BP: 130/84   Pulse: 92   SpO2: 96%   Weight: 287 lb (130.2 kg)   Height: 5' 11\" (1.803 m)       Physical Exam  Constitutional:       Appearance: He is well-developed. HENT:      Head: Normocephalic. Eyes:      Conjunctiva/sclera: Conjunctivae normal.   Cardiovascular:      Rate and Rhythm: Regular rhythm. Pulmonary:      Effort: Pulmonary effort is normal. No respiratory distress. Abdominal:      General: There is no distension. Musculoskeletal:         General: Normal range of motion. Cervical back: Normal range of motion. Skin:     Coloration: Skin is not jaundiced. Neurological:      Mental Status: He is alert. Cranial Nerves: No cranial nerve deficit. Psychiatric:         Mood and Affect: Mood normal.     Assessment/Plan  Pacoima Lobo was seen today for new patient and anxiety. Diagnoses and all orders for this visit:    Anxiety  -     Amb External Referral To Psychiatry   I discuss his high dose anxiety pills. Return if symptoms worsen or fail to improve.     Teresa Trujillo MD

## 2023-04-22 ENCOUNTER — HOSPITAL ENCOUNTER (EMERGENCY)
Age: 38
Discharge: HOME | End: 2023-04-22
Payer: COMMERCIAL

## 2023-04-22 ENCOUNTER — APPOINTMENT (OUTPATIENT)
Dept: GENERAL RADIOLOGY | Age: 38
End: 2023-04-22
Payer: COMMERCIAL

## 2023-04-22 VITALS
SYSTOLIC BLOOD PRESSURE: 141 MMHG | BODY MASS INDEX: 39.9 KG/M2 | RESPIRATION RATE: 16 BRPM | WEIGHT: 285 LBS | DIASTOLIC BLOOD PRESSURE: 84 MMHG | OXYGEN SATURATION: 97 % | HEIGHT: 71 IN | HEART RATE: 88 BPM | TEMPERATURE: 98.4 F

## 2023-04-22 DIAGNOSIS — R05.8 COUGH WITH SPUTUM: ICD-10-CM

## 2023-04-22 DIAGNOSIS — J06.9 UPPER RESPIRATORY INFECTION WITH COUGH AND CONGESTION: Primary | ICD-10-CM

## 2023-04-22 DIAGNOSIS — R09.89 CHEST CONGESTION: ICD-10-CM

## 2023-04-22 PROBLEM — F32.A DEPRESSIVE DISORDER: Status: ACTIVE | Noted: 2023-04-22

## 2023-04-22 PROBLEM — B17.9 ACUTE HEPATITIS: Status: ACTIVE | Noted: 2023-04-22

## 2023-04-22 PROBLEM — F11.20 OPIOID DEPENDENCE (HCC): Status: ACTIVE | Noted: 2023-04-22

## 2023-04-22 PROBLEM — E55.9 VITAMIN D DEFICIENCY: Status: ACTIVE | Noted: 2023-04-22

## 2023-04-22 PROCEDURE — 6370000000 HC RX 637 (ALT 250 FOR IP): Performed by: PHYSICIAN ASSISTANT

## 2023-04-22 PROCEDURE — 71045 X-RAY EXAM CHEST 1 VIEW: CPT

## 2023-04-22 PROCEDURE — 99283 EMERGENCY DEPT VISIT LOW MDM: CPT

## 2023-04-22 RX ORDER — BENZONATATE 100 MG/1
200 CAPSULE ORAL ONCE
Status: COMPLETED | OUTPATIENT
Start: 2023-04-22 | End: 2023-04-22

## 2023-04-22 RX ORDER — DOXYCYCLINE HYCLATE 100 MG
100 TABLET ORAL 2 TIMES DAILY
Qty: 14 TABLET | Refills: 0 | Status: SHIPPED | OUTPATIENT
Start: 2023-04-22 | End: 2023-04-29

## 2023-04-22 RX ORDER — ALBUTEROL SULFATE 90 UG/1
2 AEROSOL, METERED RESPIRATORY (INHALATION) 4 TIMES DAILY PRN
Qty: 6.7 G | Refills: 0 | Status: SHIPPED | OUTPATIENT
Start: 2023-04-22

## 2023-04-22 RX ORDER — DOXYCYCLINE HYCLATE 100 MG/1
100 CAPSULE ORAL ONCE
Status: COMPLETED | OUTPATIENT
Start: 2023-04-22 | End: 2023-04-22

## 2023-04-22 RX ORDER — BROMPHENIRAMINE MALEATE, PSEUDOEPHEDRINE HYDROCHLORIDE, AND DEXTROMETHORPHAN HYDROBROMIDE 2; 30; 10 MG/5ML; MG/5ML; MG/5ML
2.5-5 SYRUP ORAL 4 TIMES DAILY PRN
Qty: 118 ML | Refills: 0 | Status: SHIPPED | OUTPATIENT
Start: 2023-04-22

## 2023-04-22 RX ORDER — GUAIFENESIN/DEXTROMETHORPHAN 100-10MG/5
10 SYRUP ORAL ONCE
Status: COMPLETED | OUTPATIENT
Start: 2023-04-22 | End: 2023-04-22

## 2023-04-22 RX ADMIN — DOXYCYCLINE HYCLATE 100 MG: 100 CAPSULE ORAL at 14:44

## 2023-04-22 RX ADMIN — GUAIFENESIN SYRUP AND DEXTROMETHORPHAN 10 ML: 100; 10 SYRUP ORAL at 13:59

## 2023-04-22 RX ADMIN — BENZONATATE 200 MG: 100 CAPSULE ORAL at 14:00

## 2023-04-22 ASSESSMENT — ENCOUNTER SYMPTOMS
SINUS PAIN: 1
COUGH: 1
SHORTNESS OF BREATH: 1
WHEEZING: 0
SINUS PRESSURE: 1
ABDOMINAL PAIN: 0

## 2023-04-22 ASSESSMENT — LIFESTYLE VARIABLES
HOW MANY STANDARD DRINKS CONTAINING ALCOHOL DO YOU HAVE ON A TYPICAL DAY: PATIENT DOES NOT DRINK
HOW OFTEN DO YOU HAVE A DRINK CONTAINING ALCOHOL: NEVER

## 2023-04-22 ASSESSMENT — PAIN - FUNCTIONAL ASSESSMENT
PAIN_FUNCTIONAL_ASSESSMENT: NONE - DENIES PAIN
PAIN_FUNCTIONAL_ASSESSMENT: NONE - DENIES PAIN

## 2023-04-22 NOTE — ED TRIAGE NOTES
Patient c/o cough and SOB. States was seen at urgent care on Tuesday and had negative covid test.  Patient taking medications with little improvement. Respirations equal and unlabored.

## 2023-04-22 NOTE — ED PROVIDER NOTES
outcomes   -were given a work/school excuse, if needed  -were told that we would call them with final positive culture/lab results       Tamara Almaguer PA-C  04/22/23 2454

## 2023-06-19 ENCOUNTER — OFFICE VISIT (OUTPATIENT)
Dept: PRIMARY CARE CLINIC | Age: 38
End: 2023-06-19
Payer: COMMERCIAL

## 2023-06-19 VITALS
SYSTOLIC BLOOD PRESSURE: 112 MMHG | HEIGHT: 71 IN | WEIGHT: 286 LBS | HEART RATE: 82 BPM | OXYGEN SATURATION: 95 % | BODY MASS INDEX: 40.04 KG/M2 | DIASTOLIC BLOOD PRESSURE: 88 MMHG

## 2023-06-19 DIAGNOSIS — F41.9 ANXIETY: Primary | ICD-10-CM

## 2023-06-19 PROCEDURE — 99213 OFFICE O/P EST LOW 20 MIN: CPT | Performed by: INTERNAL MEDICINE

## 2023-06-19 PROCEDURE — G8427 DOCREV CUR MEDS BY ELIG CLIN: HCPCS | Performed by: INTERNAL MEDICINE

## 2023-06-19 PROCEDURE — 4004F PT TOBACCO SCREEN RCVD TLK: CPT | Performed by: INTERNAL MEDICINE

## 2023-06-19 PROCEDURE — G8417 CALC BMI ABV UP PARAM F/U: HCPCS | Performed by: INTERNAL MEDICINE

## 2023-06-19 RX ORDER — ALPRAZOLAM 1 MG/1
1 TABLET ORAL 4 TIMES DAILY PRN
Qty: 120 TABLET | Refills: 0 | Status: SHIPPED | OUTPATIENT
Start: 2023-06-19 | End: 2023-07-19

## 2023-06-19 ASSESSMENT — ENCOUNTER SYMPTOMS
VOICE CHANGE: 0
PHOTOPHOBIA: 0
ABDOMINAL DISTENTION: 0
VOMITING: 0
NAUSEA: 0
SHORTNESS OF BREATH: 0
TROUBLE SWALLOWING: 0
CHOKING: 0

## 2023-06-27 ENCOUNTER — TELEPHONE (OUTPATIENT)
Dept: PRIMARY CARE CLINIC | Age: 38
End: 2023-06-27

## 2023-07-13 ENCOUNTER — OFFICE VISIT (OUTPATIENT)
Dept: BEHAVIORAL/MENTAL HEALTH CLINIC | Age: 38
End: 2023-07-13

## 2023-07-13 DIAGNOSIS — F41.0 PANIC DISORDER: ICD-10-CM

## 2023-07-13 DIAGNOSIS — F43.10 PTSD (POST-TRAUMATIC STRESS DISORDER): Primary | ICD-10-CM

## 2023-07-13 ASSESSMENT — PATIENT HEALTH QUESTIONNAIRE - PHQ9
1. LITTLE INTEREST OR PLEASURE IN DOING THINGS: 1
3. TROUBLE FALLING OR STAYING ASLEEP: 1
5. POOR APPETITE OR OVEREATING: 1
8. MOVING OR SPEAKING SO SLOWLY THAT OTHER PEOPLE COULD HAVE NOTICED. OR THE OPPOSITE, BEING SO FIGETY OR RESTLESS THAT YOU HAVE BEEN MOVING AROUND A LOT MORE THAN USUAL: 1
4. FEELING TIRED OR HAVING LITTLE ENERGY: 1
SUM OF ALL RESPONSES TO PHQ QUESTIONS 1-9: 6
2. FEELING DOWN, DEPRESSED OR HOPELESS: 1
SUM OF ALL RESPONSES TO PHQ QUESTIONS 1-9: 6
SUM OF ALL RESPONSES TO PHQ9 QUESTIONS 1 & 2: 2
10. IF YOU CHECKED OFF ANY PROBLEMS, HOW DIFFICULT HAVE THESE PROBLEMS MADE IT FOR YOU TO DO YOUR WORK, TAKE CARE OF THINGS AT HOME, OR GET ALONG WITH OTHER PEOPLE: 1
SUM OF ALL RESPONSES TO PHQ QUESTIONS 1-9: 6
9. THOUGHTS THAT YOU WOULD BE BETTER OFF DEAD, OR OF HURTING YOURSELF: 0
SUM OF ALL RESPONSES TO PHQ QUESTIONS 1-9: 6
7. TROUBLE CONCENTRATING ON THINGS, SUCH AS READING THE NEWSPAPER OR WATCHING TELEVISION: 0
6. FEELING BAD ABOUT YOURSELF - OR THAT YOU ARE A FAILURE OR HAVE LET YOURSELF OR YOUR FAMILY DOWN: 0

## 2023-07-13 ASSESSMENT — ANXIETY QUESTIONNAIRES
1. FEELING NERVOUS, ANXIOUS, OR ON EDGE: 3
3. WORRYING TOO MUCH ABOUT DIFFERENT THINGS: 3
4. TROUBLE RELAXING: 0
5. BEING SO RESTLESS THAT IT IS HARD TO SIT STILL: 0
IF YOU CHECKED OFF ANY PROBLEMS ON THIS QUESTIONNAIRE, HOW DIFFICULT HAVE THESE PROBLEMS MADE IT FOR YOU TO DO YOUR WORK, TAKE CARE OF THINGS AT HOME, OR GET ALONG WITH OTHER PEOPLE: SOMEWHAT DIFFICULT
GAD7 TOTAL SCORE: 11
7. FEELING AFRAID AS IF SOMETHING AWFUL MIGHT HAPPEN: 1
6. BECOMING EASILY ANNOYED OR IRRITABLE: 1
2. NOT BEING ABLE TO STOP OR CONTROL WORRYING: 3

## 2023-07-17 ENCOUNTER — OFFICE VISIT (OUTPATIENT)
Dept: PRIMARY CARE CLINIC | Age: 38
End: 2023-07-17
Payer: COMMERCIAL

## 2023-07-17 VITALS
BODY MASS INDEX: 40.18 KG/M2 | OXYGEN SATURATION: 98 % | SYSTOLIC BLOOD PRESSURE: 138 MMHG | HEIGHT: 71 IN | DIASTOLIC BLOOD PRESSURE: 88 MMHG | WEIGHT: 287 LBS | HEART RATE: 93 BPM

## 2023-07-17 DIAGNOSIS — F41.9 ANXIETY: Primary | ICD-10-CM

## 2023-07-17 DIAGNOSIS — Z51.81 THERAPEUTIC DRUG MONITORING: ICD-10-CM

## 2023-07-17 PROCEDURE — G8427 DOCREV CUR MEDS BY ELIG CLIN: HCPCS | Performed by: INTERNAL MEDICINE

## 2023-07-17 PROCEDURE — 4004F PT TOBACCO SCREEN RCVD TLK: CPT | Performed by: INTERNAL MEDICINE

## 2023-07-17 PROCEDURE — 99213 OFFICE O/P EST LOW 20 MIN: CPT | Performed by: INTERNAL MEDICINE

## 2023-07-17 PROCEDURE — G8417 CALC BMI ABV UP PARAM F/U: HCPCS | Performed by: INTERNAL MEDICINE

## 2023-07-17 RX ORDER — ALPRAZOLAM 1 MG/1
1 TABLET ORAL 4 TIMES DAILY PRN
Qty: 120 TABLET | Refills: 0 | Status: SHIPPED | OUTPATIENT
Start: 2023-07-17 | End: 2023-08-16

## 2023-07-17 ASSESSMENT — ENCOUNTER SYMPTOMS
PHOTOPHOBIA: 0
CHOKING: 0
VOICE CHANGE: 0
VOMITING: 0
NAUSEA: 0
TROUBLE SWALLOWING: 0
SHORTNESS OF BREATH: 0

## 2023-07-17 NOTE — PROGRESS NOTES
Markie Hernández 45 y.o. male presents today with   Chief Complaint   Patient presents with    1 Month Follow-Up       Anxiety  Presents for follow-up visit. Symptoms include irritability and nervous/anxious behavior. Patient reports no chest pain, nausea, palpitations, shortness of breath or suicidal ideas. Past Medical History:   Diagnosis Date    Anxiety     Jose Brooks. Patient Active Problem List    Diagnosis Date Noted    Acute hepatitis 04/22/2023    Depressive disorder 04/22/2023    Opioid dependence ([address]) 04/22/2023    Vitamin D deficiency 04/22/2023     Past Surgical History:   Procedure Laterality Date    FOOT SURGERY      WISDOM TOOTH EXTRACTION Bilateral      Family History   Problem Relation Age of Onset    Cancer Father     Diabetes Father     Alzheimer's Disease Maternal Grandmother     Heart Disease Maternal Grandfather      Social History     Socioeconomic History    Marital status: Single   Tobacco Use    Smoking status: Every Day     Packs/day: 1.00     Years: 15.00     Pack years: 15.00     Types: Cigarettes    Smokeless tobacco: Never   Vaping Use    Vaping Use: Never used   Substance and Sexual Activity    Alcohol use: Not Currently    Drug use: Yes     Types: Marijuana (Weed)     Social Determinants of Health     Financial Resource Strain: Low Risk     Difficulty of Paying Living Expenses: Not hard at all   Food Insecurity: No Food Insecurity    Worried About Running Out of Food in the Last Year: Never true    Ran Out of Food in the Last Year: Never true   Transportation Needs: Unknown    Lack of Transportation (Non-Medical): No   Housing Stability: Unknown    Unstable Housing in the Last Year: No     No Known Allergies    Review of Systems   Constitutional:  Positive for irritability. Negative for fatigue and fever. HENT:  Negative for trouble swallowing and voice change. Eyes:  Negative for photophobia and visual disturbance.    Respiratory:  Negative for choking and

## 2023-08-14 ENCOUNTER — OFFICE VISIT (OUTPATIENT)
Dept: PRIMARY CARE CLINIC | Age: 38
End: 2023-08-14
Payer: COMMERCIAL

## 2023-08-14 VITALS
DIASTOLIC BLOOD PRESSURE: 98 MMHG | SYSTOLIC BLOOD PRESSURE: 140 MMHG | WEIGHT: 289 LBS | BODY MASS INDEX: 40.31 KG/M2 | OXYGEN SATURATION: 98 % | HEART RATE: 65 BPM

## 2023-08-14 DIAGNOSIS — Z51.81 THERAPEUTIC DRUG MONITORING: ICD-10-CM

## 2023-08-14 DIAGNOSIS — F41.9 ANXIETY: ICD-10-CM

## 2023-08-14 PROCEDURE — G8417 CALC BMI ABV UP PARAM F/U: HCPCS | Performed by: INTERNAL MEDICINE

## 2023-08-14 PROCEDURE — 4004F PT TOBACCO SCREEN RCVD TLK: CPT | Performed by: INTERNAL MEDICINE

## 2023-08-14 PROCEDURE — 99213 OFFICE O/P EST LOW 20 MIN: CPT | Performed by: INTERNAL MEDICINE

## 2023-08-14 PROCEDURE — G8427 DOCREV CUR MEDS BY ELIG CLIN: HCPCS | Performed by: INTERNAL MEDICINE

## 2023-08-14 RX ORDER — ALPRAZOLAM 1 MG/1
1 TABLET ORAL 3 TIMES DAILY PRN
Qty: 90 TABLET | Refills: 1 | Status: SHIPPED | OUTPATIENT
Start: 2023-08-14 | End: 2023-10-13

## 2023-08-14 NOTE — PROGRESS NOTES
Krysta Hastings 45 y.o. male presents today with   Chief Complaint   Patient presents with    1 Month Follow-Up    Anxiety    Medication Refill       Anxiety  Presents for follow-up visit. Symptoms include irritability and nervous/anxious behavior. Patient reports no chest pain, nausea, palpitations, shortness of breath or suicidal ideas. Medication Refill  Pertinent negatives include no chest pain, fatigue, fever, nausea, rash or vomiting. Past Medical History:   Diagnosis Date    Anxiety     Jose Brooks. Patient Active Problem List    Diagnosis Date Noted    Acute hepatitis 04/22/2023    Depressive disorder 04/22/2023    Opioid dependence ([address] ) 04/22/2023    Vitamin D deficiency 04/22/2023     Past Surgical History:   Procedure Laterality Date    FOOT SURGERY      WISDOM TOOTH EXTRACTION Bilateral      Family History   Problem Relation Age of Onset    Cancer Father     Diabetes Father     Alzheimer's Disease Maternal Grandmother     Heart Disease Maternal Grandfather      Social History     Socioeconomic History    Marital status: Single   Tobacco Use    Smoking status: Every Day     Packs/day: 1.00     Years: 15.00     Pack years: 15.00     Types: Cigarettes    Smokeless tobacco: Never   Vaping Use    Vaping Use: Never used   Substance and Sexual Activity    Alcohol use: Not Currently    Drug use: Yes     Types: Marijuana (Weed)     Social Determinants of Health     Financial Resource Strain: Low Risk     Difficulty of Paying Living Expenses: Not hard at all   Food Insecurity: No Food Insecurity    Worried About Running Out of Food in the Last Year: Never true    Ran Out of Food in the Last Year: Never true   Transportation Needs: Unknown    Lack of Transportation (Non-Medical): No   Housing Stability: Unknown    Unstable Housing in the Last Year: No     No Known Allergies    Review of Systems   Constitutional:  Positive for irritability. Negative for fatigue and fever.    HENT:  Negative for

## 2023-08-18 LAB
6MAM UR QL: NOT DETECTED
7-AMINOCLONAZEPAM: NOT DETECTED
ALPHA-OH-ALPRAZOLAM: PRESENT
ALPHA-OH-MIDAZOLAM, URINE: NOT DETECTED
ALPRAZOLAM: PRESENT
AMPHET UR QL SCN: NOT DETECTED
BARBITURATES: NOT DETECTED
BENZOYLECGONINE: NOT DETECTED
BUPRENORPHINE: NOT DETECTED
CARISOPRODOL UR QL: NOT DETECTED
CLONAZEPAM UR QL: NOT DETECTED
CODEINE: NOT DETECTED
CREAT UR-MCNC: 188.1 MG/DL (ref 20–400)
DIAZEPAM: NOT DETECTED
ETHYL GLUCURONIDE: NOT DETECTED
FENTANYL UR QL: NOT DETECTED
GABAPENTIN: NOT DETECTED
HYDROCODONE UR QL: NOT DETECTED
HYDROMORPHONE: NOT DETECTED
LORAZEPAM UR QL: NOT DETECTED
MARIJUANA METABOLITE: PRESENT
MDA: NOT DETECTED
MDEA: NOT DETECTED
MDMA UR QL: NOT DETECTED
MEPERIDINE: NOT DETECTED
METHADONE: NOT DETECTED
METHAMPHETAMINE: NOT DETECTED
METHYLPHENIDATE: NOT DETECTED
MIDAZOLAM UR QL SCN: NOT DETECTED
MORPHINE: NOT DETECTED
NALOXONE: NOT DETECTED
NORBUPRENORPHINE, FREE: NOT DETECTED
NORDIAZEPAM: NOT DETECTED
NORFENTANYL: NOT DETECTED
NORHYDROCODONE, URINE: NOT DETECTED
NOROXYCODONE: NOT DETECTED
NOROXYMORPHONE, URINE: NOT DETECTED
OXAZEPAM UR QL: NOT DETECTED
OXYCODONE UR QL: NOT DETECTED
OXYMORPHONE UR QL: NOT DETECTED
PAIN MANAGEMENT DRUG PANEL: NORMAL
PATHOLOGY STUDY: NORMAL
PCP: NOT DETECTED
PHENTERMINE: NOT DETECTED
PREGABALIN: NOT DETECTED
TAPENTADOL, URINE: NOT DETECTED
TAPENTADOL-O-SULFATE, URINE: NOT DETECTED
TEMAZEPAM: NOT DETECTED
TRAMADOL: NOT DETECTED
ZOLPIDEM: NOT DETECTED

## 2023-08-20 ASSESSMENT — ENCOUNTER SYMPTOMS
TROUBLE SWALLOWING: 0
VOMITING: 0
CHOKING: 0
PHOTOPHOBIA: 0
NAUSEA: 0
VOICE CHANGE: 0
SHORTNESS OF BREATH: 0

## 2023-09-11 ENCOUNTER — HOSPITAL ENCOUNTER (INPATIENT)
Age: 38
LOS: 1 days | Discharge: HOME | DRG: 263 | End: 2023-09-12
Attending: EMERGENCY MEDICINE | Admitting: SURGERY
Payer: COMMERCIAL

## 2023-09-11 ENCOUNTER — APPOINTMENT (OUTPATIENT)
Dept: ULTRASOUND IMAGING | Age: 38
DRG: 263 | End: 2023-09-11
Payer: COMMERCIAL

## 2023-09-11 ENCOUNTER — APPOINTMENT (OUTPATIENT)
Dept: CT IMAGING | Age: 38
DRG: 263 | End: 2023-09-11
Attending: EMERGENCY MEDICINE
Payer: COMMERCIAL

## 2023-09-11 DIAGNOSIS — K81.0 ACUTE CHOLECYSTITIS: ICD-10-CM

## 2023-09-11 DIAGNOSIS — K81.9 CHOLECYSTITIS: Primary | ICD-10-CM

## 2023-09-11 LAB
ALBUMIN SERPL-MCNC: 4.1 G/DL (ref 3.5–4.6)
ALP SERPL-CCNC: 125 U/L (ref 35–104)
ALT SERPL-CCNC: 16 U/L (ref 0–41)
AMYLASE SERPL-CCNC: 35 U/L (ref 22–93)
ANION GAP SERPL CALCULATED.3IONS-SCNC: 12 MEQ/L (ref 9–15)
AST SERPL-CCNC: 15 U/L (ref 0–40)
BACTERIA URNS QL MICRO: NEGATIVE /HPF
BASOPHILS # BLD: 0.1 K/UL (ref 0–0.2)
BASOPHILS NFR BLD: 0.7 %
BILIRUB SERPL-MCNC: 0.6 MG/DL (ref 0.2–0.7)
BILIRUB UR QL STRIP: ABNORMAL
BUN SERPL-MCNC: 11 MG/DL (ref 6–20)
CALCIUM SERPL-MCNC: 8.8 MG/DL (ref 8.5–9.9)
CHLORIDE SERPL-SCNC: 100 MEQ/L (ref 95–107)
CLARITY UR: ABNORMAL
CO2 SERPL-SCNC: 23 MEQ/L (ref 20–31)
COLOR UR: ABNORMAL
CREAT SERPL-MCNC: 1.01 MG/DL (ref 0.7–1.2)
EOSINOPHIL # BLD: 0.1 K/UL (ref 0–0.7)
EOSINOPHIL NFR BLD: 0.4 %
EPI CELLS #/AREA URNS AUTO: ABNORMAL /HPF (ref 0–5)
ERYTHROCYTE [DISTWIDTH] IN BLOOD BY AUTOMATED COUNT: 13.4 % (ref 11.5–14.5)
GLOBULIN SER CALC-MCNC: 3.6 G/DL (ref 2.3–3.5)
GLUCOSE SERPL-MCNC: 119 MG/DL (ref 70–99)
GLUCOSE UR STRIP-MCNC: NEGATIVE MG/DL
HCT VFR BLD AUTO: 45.1 % (ref 42–52)
HGB BLD-MCNC: 15.3 G/DL (ref 14–18)
HGB UR QL STRIP: NEGATIVE
HYALINE CASTS #/AREA URNS AUTO: ABNORMAL /HPF (ref 0–5)
KETONES UR STRIP-MCNC: ABNORMAL MG/DL
LEUKOCYTE ESTERASE UR QL STRIP: NEGATIVE
LIPASE SERPL-CCNC: 23 U/L (ref 12–95)
LYMPHOCYTES # BLD: 2.3 K/UL (ref 1–4.8)
LYMPHOCYTES NFR BLD: 15.2 %
MCH RBC QN AUTO: 31 PG (ref 27–31.3)
MCHC RBC AUTO-ENTMCNC: 34 % (ref 33–37)
MCV RBC AUTO: 91.2 FL (ref 79–92.2)
MONOCYTES # BLD: 1.1 K/UL (ref 0.2–0.8)
MONOCYTES NFR BLD: 7.3 %
NEUTROPHILS # BLD: 11.4 K/UL (ref 1.4–6.5)
NEUTS SEG NFR BLD: 76.4 %
NITRITE UR QL STRIP: NEGATIVE
PH UR STRIP: 5.5 [PH] (ref 5–9)
PLATELET # BLD AUTO: 376 K/UL (ref 130–400)
POTASSIUM SERPL-SCNC: 3.5 MEQ/L (ref 3.4–4.9)
PROT SERPL-MCNC: 7.7 G/DL (ref 6.3–8)
PROT UR STRIP-MCNC: 30 MG/DL
RBC # BLD AUTO: 4.94 M/UL (ref 4.7–6.1)
RBC #/AREA URNS AUTO: ABNORMAL /HPF (ref 0–5)
SODIUM SERPL-SCNC: 135 MEQ/L (ref 135–144)
SP GR UR STRIP: 1.03 (ref 1–1.03)
UROBILINOGEN UR STRIP-ACNC: 1 E.U./DL
WBC # BLD AUTO: 14.9 K/UL (ref 4.8–10.8)
WBC #/AREA URNS AUTO: ABNORMAL /HPF (ref 0–5)

## 2023-09-11 PROCEDURE — 6370000000 HC RX 637 (ALT 250 FOR IP): Performed by: SURGERY

## 2023-09-11 PROCEDURE — 76705 ECHO EXAM OF ABDOMEN: CPT

## 2023-09-11 PROCEDURE — 2580000003 HC RX 258: Performed by: SURGERY

## 2023-09-11 PROCEDURE — 81001 URINALYSIS AUTO W/SCOPE: CPT

## 2023-09-11 PROCEDURE — 99285 EMERGENCY DEPT VISIT HI MDM: CPT

## 2023-09-11 PROCEDURE — 82150 ASSAY OF AMYLASE: CPT

## 2023-09-11 PROCEDURE — 83690 ASSAY OF LIPASE: CPT

## 2023-09-11 PROCEDURE — 1210000000 HC MED SURG R&B

## 2023-09-11 PROCEDURE — 96365 THER/PROPH/DIAG IV INF INIT: CPT

## 2023-09-11 PROCEDURE — 6360000002 HC RX W HCPCS: Performed by: EMERGENCY MEDICINE

## 2023-09-11 PROCEDURE — 96375 TX/PRO/DX INJ NEW DRUG ADDON: CPT

## 2023-09-11 PROCEDURE — 2500000003 HC RX 250 WO HCPCS: Performed by: SURGERY

## 2023-09-11 PROCEDURE — 36415 COLL VENOUS BLD VENIPUNCTURE: CPT

## 2023-09-11 PROCEDURE — 74177 CT ABD & PELVIS W/CONTRAST: CPT

## 2023-09-11 PROCEDURE — C9113 INJ PANTOPRAZOLE SODIUM, VIA: HCPCS | Performed by: EMERGENCY MEDICINE

## 2023-09-11 PROCEDURE — 6360000002 HC RX W HCPCS: Performed by: SURGERY

## 2023-09-11 PROCEDURE — 96372 THER/PROPH/DIAG INJ SC/IM: CPT

## 2023-09-11 PROCEDURE — 2580000003 HC RX 258: Performed by: EMERGENCY MEDICINE

## 2023-09-11 PROCEDURE — 99221 1ST HOSP IP/OBS SF/LOW 40: CPT | Performed by: SURGERY

## 2023-09-11 PROCEDURE — G0378 HOSPITAL OBSERVATION PER HR: HCPCS

## 2023-09-11 PROCEDURE — 80053 COMPREHEN METABOLIC PANEL: CPT

## 2023-09-11 PROCEDURE — 85025 COMPLETE CBC W/AUTO DIFF WBC: CPT

## 2023-09-11 PROCEDURE — 0FT44ZZ RESECTION OF GALLBLADDER, PERCUTANEOUS ENDOSCOPIC APPROACH: ICD-10-PCS | Performed by: SURGERY

## 2023-09-11 PROCEDURE — 96376 TX/PRO/DX INJ SAME DRUG ADON: CPT

## 2023-09-11 PROCEDURE — 6360000004 HC RX CONTRAST MEDICATION: Performed by: EMERGENCY MEDICINE

## 2023-09-11 RX ORDER — ONDANSETRON 4 MG/1
4 TABLET, ORALLY DISINTEGRATING ORAL EVERY 8 HOURS PRN
Status: DISCONTINUED | OUTPATIENT
Start: 2023-09-11 | End: 2023-09-12 | Stop reason: HOSPADM

## 2023-09-11 RX ORDER — 0.9 % SODIUM CHLORIDE 0.9 %
1000 INTRAVENOUS SOLUTION INTRAVENOUS ONCE
Status: COMPLETED | OUTPATIENT
Start: 2023-09-11 | End: 2023-09-11

## 2023-09-11 RX ORDER — ONDANSETRON 2 MG/ML
4 INJECTION INTRAMUSCULAR; INTRAVENOUS EVERY 6 HOURS PRN
Status: DISCONTINUED | OUTPATIENT
Start: 2023-09-11 | End: 2023-09-12 | Stop reason: HOSPADM

## 2023-09-11 RX ORDER — SODIUM CHLORIDE 0.9 % (FLUSH) 0.9 %
5-40 SYRINGE (ML) INJECTION PRN
Status: DISCONTINUED | OUTPATIENT
Start: 2023-09-11 | End: 2023-09-12 | Stop reason: HOSPADM

## 2023-09-11 RX ORDER — NICOTINE 21 MG/24HR
1 PATCH, TRANSDERMAL 24 HOURS TRANSDERMAL DAILY
Status: DISCONTINUED | OUTPATIENT
Start: 2023-09-11 | End: 2023-09-12 | Stop reason: HOSPADM

## 2023-09-11 RX ORDER — DICYCLOMINE HYDROCHLORIDE 10 MG/ML
20 INJECTION INTRAMUSCULAR ONCE
Status: COMPLETED | OUTPATIENT
Start: 2023-09-11 | End: 2023-09-11

## 2023-09-11 RX ORDER — HYDROMORPHONE HYDROCHLORIDE 1 MG/ML
0.5 INJECTION, SOLUTION INTRAMUSCULAR; INTRAVENOUS; SUBCUTANEOUS
Status: DISCONTINUED | OUTPATIENT
Start: 2023-09-11 | End: 2023-09-12 | Stop reason: HOSPADM

## 2023-09-11 RX ORDER — SODIUM CHLORIDE 0.9 % (FLUSH) 0.9 %
5-40 SYRINGE (ML) INJECTION EVERY 12 HOURS SCHEDULED
Status: DISCONTINUED | OUTPATIENT
Start: 2023-09-11 | End: 2023-09-12 | Stop reason: HOSPADM

## 2023-09-11 RX ORDER — SODIUM CHLORIDE, SODIUM LACTATE, POTASSIUM CHLORIDE, CALCIUM CHLORIDE 600; 310; 30; 20 MG/100ML; MG/100ML; MG/100ML; MG/100ML
INJECTION, SOLUTION INTRAVENOUS CONTINUOUS
Status: DISCONTINUED | OUTPATIENT
Start: 2023-09-12 | End: 2023-09-12 | Stop reason: HOSPADM

## 2023-09-11 RX ORDER — ENOXAPARIN SODIUM 100 MG/ML
30 INJECTION SUBCUTANEOUS 2 TIMES DAILY
Status: DISCONTINUED | OUTPATIENT
Start: 2023-09-11 | End: 2023-09-12 | Stop reason: HOSPADM

## 2023-09-11 RX ORDER — NALBUPHINE HYDROCHLORIDE 20 MG/ML
10 INJECTION, SOLUTION INTRAMUSCULAR; INTRAVENOUS; SUBCUTANEOUS ONCE
Status: COMPLETED | OUTPATIENT
Start: 2023-09-11 | End: 2023-09-11

## 2023-09-11 RX ORDER — SODIUM CHLORIDE 9 MG/ML
INJECTION, SOLUTION INTRAVENOUS PRN
Status: DISCONTINUED | OUTPATIENT
Start: 2023-09-11 | End: 2023-09-12 | Stop reason: HOSPADM

## 2023-09-11 RX ORDER — ONDANSETRON 2 MG/ML
4 INJECTION INTRAMUSCULAR; INTRAVENOUS ONCE
Status: COMPLETED | OUTPATIENT
Start: 2023-09-11 | End: 2023-09-11

## 2023-09-11 RX ORDER — ALPRAZOLAM 1 MG/1
1 TABLET ORAL 3 TIMES DAILY PRN
Status: DISCONTINUED | OUTPATIENT
Start: 2023-09-11 | End: 2023-09-12 | Stop reason: HOSPADM

## 2023-09-11 RX ADMIN — IOPAMIDOL 75 ML: 612 INJECTION, SOLUTION INTRAVENOUS at 15:05

## 2023-09-11 RX ADMIN — Medication 10 ML: at 22:28

## 2023-09-11 RX ADMIN — DICYCLOMINE HYDROCHLORIDE 20 MG: 10 INJECTION, SOLUTION INTRAMUSCULAR at 11:42

## 2023-09-11 RX ADMIN — NALBUPHINE HYDROCHLORIDE 10 MG: 20 INJECTION, SOLUTION INTRAMUSCULAR; INTRAVENOUS; SUBCUTANEOUS at 13:58

## 2023-09-11 RX ADMIN — ONDANSETRON 4 MG: 2 INJECTION INTRAMUSCULAR; INTRAVENOUS at 13:58

## 2023-09-11 RX ADMIN — PIPERACILLIN AND TAZOBACTAM 3375 MG: 3; .375 INJECTION, POWDER, LYOPHILIZED, FOR SOLUTION INTRAVENOUS at 16:50

## 2023-09-11 RX ADMIN — ALPRAZOLAM 1 MG: 1 TABLET ORAL at 22:18

## 2023-09-11 RX ADMIN — ENOXAPARIN SODIUM 30 MG: 30 INJECTION SUBCUTANEOUS at 22:19

## 2023-09-11 RX ADMIN — HYDROMORPHONE HYDROCHLORIDE 0.5 MG: 1 INJECTION, SOLUTION INTRAMUSCULAR; INTRAVENOUS; SUBCUTANEOUS at 18:18

## 2023-09-11 RX ADMIN — SODIUM CHLORIDE 1000 ML: 9 INJECTION, SOLUTION INTRAVENOUS at 12:11

## 2023-09-11 RX ADMIN — SODIUM CHLORIDE 40 MG: 900 INJECTION INTRAVENOUS at 13:44

## 2023-09-11 RX ADMIN — HYDROMORPHONE HYDROCHLORIDE 0.5 MG: 1 INJECTION, SOLUTION INTRAMUSCULAR; INTRAVENOUS; SUBCUTANEOUS at 22:18

## 2023-09-11 ASSESSMENT — ENCOUNTER SYMPTOMS
SHORTNESS OF BREATH: 0
SORE THROAT: 0
NAUSEA: 1
ABDOMINAL PAIN: 1
CHEST TIGHTNESS: 0
VOMITING: 1
EYE PAIN: 0

## 2023-09-11 ASSESSMENT — PAIN SCALES - GENERAL
PAINLEVEL_OUTOF10: 8
PAINLEVEL_OUTOF10: 7
PAINLEVEL_OUTOF10: 2
PAINLEVEL_OUTOF10: 8
PAINLEVEL_OUTOF10: 7
PAINLEVEL_OUTOF10: 7
PAINLEVEL_OUTOF10: 2
PAINLEVEL_OUTOF10: 6
PAINLEVEL_OUTOF10: 7

## 2023-09-11 ASSESSMENT — PAIN DESCRIPTION - LOCATION
LOCATION: ABDOMEN

## 2023-09-11 ASSESSMENT — PAIN DESCRIPTION - DESCRIPTORS
DESCRIPTORS: CRAMPING
DESCRIPTORS: SHARP

## 2023-09-11 ASSESSMENT — PAIN DESCRIPTION - ORIENTATION
ORIENTATION: UPPER
ORIENTATION: MID;UPPER
ORIENTATION: UPPER;MID
ORIENTATION: MID;UPPER
ORIENTATION: MID;UPPER

## 2023-09-11 ASSESSMENT — PAIN - FUNCTIONAL ASSESSMENT
PAIN_FUNCTIONAL_ASSESSMENT: 0-10

## 2023-09-11 NOTE — ED NOTES
Returned. Remains with 2/10 upper abdominal pain. States he feels good. Visitor at bedside. Aware we are waiting on results. Declines need of anything at this time.       John Perla RN  09/11/23 0401

## 2023-09-11 NOTE — ED NOTES
Labs drawn by phleb out in triage approx 25-30 min ago per triage nurse. Still says needs collected in our system. Called phleb to find out about them. She said she still has them and will send them after she's done drawing another patient. Asked her to send them soon.       Shane Bach RN  09/11/23 3249

## 2023-09-11 NOTE — ED NOTES
Returned. States his pain is 7/10 at this time. Dr Isabelle Limon made aware of continued pain. Call light within reach. IV still infusing. Visitor at beside.       Marni Salmon RN  09/11/23 8879

## 2023-09-11 NOTE — PROGRESS NOTES
Pharmacy Dose Adjustment Per Protocol    Guillaume Cox is a 45 y.o. male. Recent Labs     09/11/23  1037   BUN 11   CREATININE 1.01   Estimated Creatinine Clearance: 136 mL/min (based on SCr of 1.01 mg/dL). Celeste Alvarez Height: 5' 11\" (180.3 cm), Weight - Scale: 285 lb (129.3 kg)    Hemoglobin   Date Value Ref Range Status   09/11/2023 15.3 14.0 - 18.0 g/dL Final     Platelets   Date Value Ref Range Status   09/11/2023 376 130 - 400 K/uL Final       If yes to following, excluded from auto adjustment in Table 1 of policy - please contact provider with recommendations as appropriate. Include condition/exception in scratch notes.  Yes No   Trauma Service or Ortho Surgery []  [x]    COVID []  [x]    Pregnancy []  [x]        Current order:  Enoxaparin 40 mg SUBQ once daily      Plan:  Pharmacologic VTE prophylaxis modified based on patient weight per MEC/P&T approved protocol     Patient Weight (kg)      50.9 and below .9 101-150.9 151-174.9 175 or greater   Estimated   CrCl  (ml/min) 30 or greater []   30 mg   SUBQ daily   []   40 mg   SUBQ daily (or 30 mg BID for orthopedic cases) [x]  30 mg SUBQ   BID  []  40 mg   SUBQ   BID []  60mg SUBQ BID    15-29.9 []  UFH 5000   units SUBQ BID []  30 mg   SUBQ daily [] 30 mg SUBQ   daily []  40 mg SUBQ   daily [] 60 mg SUBQ   daily    Less than 15 or dialysis []  UFH 5000   units SUBQ BID [] UFH 5000 units SUBQ TID []  UFH 7500   units   SUBQ TID       Thank you,  Maurisio Smith, [facility] [place], PharmD

## 2023-09-11 NOTE — H&P
EMERGENCY GENERAL SURGERY CONSULTATION / H&P    Reason for Consultation:  Acute cholecystitis  Consulting Provider: Dia Nj MD    HISTORY OF PRESENT INJURY:   Gogo Gentile is a 45 y.o. male presenting to the ED following RUQ pain. The patient noted the pain 2 days ago after eating [place] food. Associated with nausea and vomiting. He has no prior complaints similar to this. No anticoagulation. No previous abdominal surgery. PMH:    Past Medical History:   Diagnosis Date    Anxiety     Jose Brooks. PSH:    Past Surgical History:   Procedure Laterality Date    FOOT SURGERY      WISDOM TOOTH EXTRACTION Bilateral        FH:    Family History   Problem Relation Age of Onset    Cancer Father     Diabetes Father     Alzheimer's Disease Maternal Grandmother     Heart Disease Maternal Grandfather      He denies h/o bleeding or clotting disorders    SH:    Social History     Tobacco Use    Smoking status: Every Day     Packs/day: 1.00     Years: 15.00     Additional pack years: 0.00     Total pack years: 15.00     Types: Cigarettes    Smokeless tobacco: Never   Vaping Use    Vaping Use: Never used   Substance Use Topics    Alcohol use: Not Currently    Drug use: Yes     Types: Marijuana (Weed)       Home Medications:  No current facility-administered medications on file prior to encounter. Current Outpatient Medications on File Prior to Encounter   Medication Sig Dispense Refill    ALPRAZolam (XANAX) 1 MG tablet Take 1 tablet by mouth 3 times daily as needed for Sleep or Anxiety for up to 60 days. Max Daily Amount: 3 mg 90 tablet 1       Review Of Systems:  Constitutional: Negative for  weight loss  HENT: Negative for congestion, facial swelling and bloody nose  Eyes: Negative for  vision changes  Respiratory: Negative for shortness of breath, difficulty breathing  Cardiovascular: Negative for chest wall pain.    Gastrointestinal: +Nausea/vomiting  Genitourinary: Negative for

## 2023-09-12 ENCOUNTER — ANESTHESIA EVENT (OUTPATIENT)
Dept: OPERATING ROOM | Age: 38
DRG: 263 | End: 2023-09-12
Payer: COMMERCIAL

## 2023-09-12 ENCOUNTER — ANESTHESIA (OUTPATIENT)
Dept: OPERATING ROOM | Age: 38
DRG: 263 | End: 2023-09-12
Payer: COMMERCIAL

## 2023-09-12 VITALS
HEART RATE: 82 BPM | TEMPERATURE: 97 F | SYSTOLIC BLOOD PRESSURE: 110 MMHG | OXYGEN SATURATION: 96 % | WEIGHT: 285 LBS | HEIGHT: 71 IN | RESPIRATION RATE: 14 BRPM | BODY MASS INDEX: 39.9 KG/M2 | DIASTOLIC BLOOD PRESSURE: 62 MMHG

## 2023-09-12 LAB
ABO + RH BLD: NORMAL
ALBUMIN SERPL-MCNC: 3.7 G/DL (ref 3.5–4.6)
ALP SERPL-CCNC: 147 U/L (ref 35–104)
ALT SERPL-CCNC: 72 U/L (ref 0–41)
ANION GAP SERPL CALCULATED.3IONS-SCNC: 10 MEQ/L (ref 9–15)
AST SERPL-CCNC: 93 U/L (ref 0–40)
BASOPHILS # BLD: 0.1 K/UL (ref 0–0.2)
BASOPHILS NFR BLD: 1.1 %
BILIRUB SERPL-MCNC: 1 MG/DL (ref 0.2–0.7)
BLD GP AB SCN SERPL QL: NORMAL
BUN SERPL-MCNC: 10 MG/DL (ref 6–20)
CALCIUM SERPL-MCNC: 8.2 MG/DL (ref 8.5–9.9)
CHLORIDE SERPL-SCNC: 104 MEQ/L (ref 95–107)
CO2 SERPL-SCNC: 24 MEQ/L (ref 20–31)
CREAT SERPL-MCNC: 1 MG/DL (ref 0.7–1.2)
EOSINOPHIL # BLD: 0.3 K/UL (ref 0–0.7)
EOSINOPHIL NFR BLD: 2.8 %
ERYTHROCYTE [DISTWIDTH] IN BLOOD BY AUTOMATED COUNT: 13.1 % (ref 11.5–14.5)
GLOBULIN SER CALC-MCNC: 3.1 G/DL (ref 2.3–3.5)
GLUCOSE SERPL-MCNC: 102 MG/DL (ref 70–99)
HCT VFR BLD AUTO: 41.1 % (ref 42–52)
HGB BLD-MCNC: 13.7 G/DL (ref 14–18)
LYMPHOCYTES # BLD: 1.9 K/UL (ref 1–4.8)
LYMPHOCYTES NFR BLD: 16.4 %
MAGNESIUM SERPL-MCNC: 1.8 MG/DL (ref 1.7–2.4)
MCH RBC QN AUTO: 30.7 PG (ref 27–31.3)
MCHC RBC AUTO-ENTMCNC: 33.3 % (ref 33–37)
MCV RBC AUTO: 92 FL (ref 79–92.2)
MONOCYTES # BLD: 1 K/UL (ref 0.2–0.8)
MONOCYTES NFR BLD: 8.6 %
NEUTROPHILS # BLD: 8.4 K/UL (ref 1.4–6.5)
NEUTS SEG NFR BLD: 71.1 %
PLATELET # BLD AUTO: 320 K/UL (ref 130–400)
POTASSIUM SERPL-SCNC: 3.5 MEQ/L (ref 3.4–4.9)
PROT SERPL-MCNC: 6.8 G/DL (ref 6.3–8)
RBC # BLD AUTO: 4.47 M/UL (ref 4.7–6.1)
SODIUM SERPL-SCNC: 138 MEQ/L (ref 135–144)
WBC # BLD AUTO: 11.8 K/UL (ref 4.8–10.8)

## 2023-09-12 PROCEDURE — 3700000001 HC ADD 15 MINUTES (ANESTHESIA): Performed by: SURGERY

## 2023-09-12 PROCEDURE — 6360000002 HC RX W HCPCS

## 2023-09-12 PROCEDURE — 86901 BLOOD TYPING SEROLOGIC RH(D): CPT

## 2023-09-12 PROCEDURE — 3700000000 HC ANESTHESIA ATTENDED CARE: Performed by: SURGERY

## 2023-09-12 PROCEDURE — 88304 TISSUE EXAM BY PATHOLOGIST: CPT

## 2023-09-12 PROCEDURE — 85025 COMPLETE CBC W/AUTO DIFF WBC: CPT

## 2023-09-12 PROCEDURE — A4217 STERILE WATER/SALINE, 500 ML: HCPCS | Performed by: SURGERY

## 2023-09-12 PROCEDURE — 2500000003 HC RX 250 WO HCPCS

## 2023-09-12 PROCEDURE — 2500000003 HC RX 250 WO HCPCS: Performed by: SURGERY

## 2023-09-12 PROCEDURE — 83735 ASSAY OF MAGNESIUM: CPT

## 2023-09-12 PROCEDURE — 3600000003 HC SURGERY LEVEL 3 BASE: Performed by: SURGERY

## 2023-09-12 PROCEDURE — 64488 TAP BLOCK BI INJECTION: CPT | Performed by: STUDENT IN AN ORGANIZED HEALTH CARE EDUCATION/TRAINING PROGRAM

## 2023-09-12 PROCEDURE — 36415 COLL VENOUS BLD VENIPUNCTURE: CPT

## 2023-09-12 PROCEDURE — 6360000002 HC RX W HCPCS: Performed by: STUDENT IN AN ORGANIZED HEALTH CARE EDUCATION/TRAINING PROGRAM

## 2023-09-12 PROCEDURE — 2580000003 HC RX 258: Performed by: SURGERY

## 2023-09-12 PROCEDURE — 47562 LAPAROSCOPIC CHOLECYSTECTOMY: CPT | Performed by: PHYSICIAN ASSISTANT

## 2023-09-12 PROCEDURE — 86850 RBC ANTIBODY SCREEN: CPT

## 2023-09-12 PROCEDURE — 7100000001 HC PACU RECOVERY - ADDTL 15 MIN: Performed by: SURGERY

## 2023-09-12 PROCEDURE — 3600000013 HC SURGERY LEVEL 3 ADDTL 15MIN: Performed by: SURGERY

## 2023-09-12 PROCEDURE — 47562 LAPAROSCOPIC CHOLECYSTECTOMY: CPT | Performed by: SURGERY

## 2023-09-12 PROCEDURE — 6360000002 HC RX W HCPCS: Performed by: SURGERY

## 2023-09-12 PROCEDURE — G0378 HOSPITAL OBSERVATION PER HR: HCPCS

## 2023-09-12 PROCEDURE — 6370000000 HC RX 637 (ALT 250 FOR IP): Performed by: SURGERY

## 2023-09-12 PROCEDURE — 2709999900 HC NON-CHARGEABLE SUPPLY: Performed by: SURGERY

## 2023-09-12 PROCEDURE — 80053 COMPREHEN METABOLIC PANEL: CPT

## 2023-09-12 PROCEDURE — 2720000010 HC SURG SUPPLY STERILE: Performed by: SURGERY

## 2023-09-12 PROCEDURE — C9399 UNCLASSIFIED DRUGS OR BIOLOG: HCPCS

## 2023-09-12 PROCEDURE — 7100000000 HC PACU RECOVERY - FIRST 15 MIN: Performed by: SURGERY

## 2023-09-12 PROCEDURE — 86900 BLOOD TYPING SEROLOGIC ABO: CPT

## 2023-09-12 RX ORDER — FENTANYL CITRATE 50 UG/ML
INJECTION, SOLUTION INTRAMUSCULAR; INTRAVENOUS PRN
Status: DISCONTINUED | OUTPATIENT
Start: 2023-09-12 | End: 2023-09-12 | Stop reason: SDUPTHER

## 2023-09-12 RX ORDER — PROPOFOL 10 MG/ML
INJECTION, EMULSION INTRAVENOUS PRN
Status: DISCONTINUED | OUTPATIENT
Start: 2023-09-12 | End: 2023-09-12 | Stop reason: SDUPTHER

## 2023-09-12 RX ORDER — OXYCODONE HYDROCHLORIDE 5 MG/1
5 TABLET ORAL PRN
Status: DISCONTINUED | OUTPATIENT
Start: 2023-09-12 | End: 2023-09-12 | Stop reason: HOSPADM

## 2023-09-12 RX ORDER — LABETALOL HYDROCHLORIDE 5 MG/ML
10 INJECTION, SOLUTION INTRAVENOUS
Status: DISCONTINUED | OUTPATIENT
Start: 2023-09-12 | End: 2023-09-12 | Stop reason: HOSPADM

## 2023-09-12 RX ORDER — DIPHENHYDRAMINE HYDROCHLORIDE 50 MG/ML
12.5 INJECTION INTRAMUSCULAR; INTRAVENOUS
Status: DISCONTINUED | OUTPATIENT
Start: 2023-09-12 | End: 2023-09-12 | Stop reason: HOSPADM

## 2023-09-12 RX ORDER — SODIUM CHLORIDE 0.9 % (FLUSH) 0.9 %
5-40 SYRINGE (ML) INJECTION PRN
Status: DISCONTINUED | OUTPATIENT
Start: 2023-09-12 | End: 2023-09-12 | Stop reason: HOSPADM

## 2023-09-12 RX ORDER — DEXAMETHASONE SODIUM PHOSPHATE 10 MG/ML
INJECTION INTRAMUSCULAR; INTRAVENOUS PRN
Status: DISCONTINUED | OUTPATIENT
Start: 2023-09-12 | End: 2023-09-12 | Stop reason: SDUPTHER

## 2023-09-12 RX ORDER — ONDANSETRON 2 MG/ML
INJECTION INTRAMUSCULAR; INTRAVENOUS PRN
Status: DISCONTINUED | OUTPATIENT
Start: 2023-09-12 | End: 2023-09-12 | Stop reason: SDUPTHER

## 2023-09-12 RX ORDER — HYDRALAZINE HYDROCHLORIDE 20 MG/ML
10 INJECTION INTRAMUSCULAR; INTRAVENOUS
Status: DISCONTINUED | OUTPATIENT
Start: 2023-09-12 | End: 2023-09-12 | Stop reason: HOSPADM

## 2023-09-12 RX ORDER — BUPIVACAINE HYDROCHLORIDE 5 MG/ML
INJECTION, SOLUTION EPIDURAL; INTRACAUDAL
Status: COMPLETED | OUTPATIENT
Start: 2023-09-12 | End: 2023-09-12

## 2023-09-12 RX ORDER — ONDANSETRON 2 MG/ML
4 INJECTION INTRAMUSCULAR; INTRAVENOUS
Status: COMPLETED | OUTPATIENT
Start: 2023-09-12 | End: 2023-09-12

## 2023-09-12 RX ORDER — MAGNESIUM HYDROXIDE 1200 MG/15ML
LIQUID ORAL CONTINUOUS PRN
Status: DISCONTINUED | OUTPATIENT
Start: 2023-09-12 | End: 2023-09-12 | Stop reason: HOSPADM

## 2023-09-12 RX ORDER — FENTANYL CITRATE 0.05 MG/ML
25 INJECTION, SOLUTION INTRAMUSCULAR; INTRAVENOUS EVERY 5 MIN PRN
Status: DISCONTINUED | OUTPATIENT
Start: 2023-09-12 | End: 2023-09-12 | Stop reason: HOSPADM

## 2023-09-12 RX ORDER — MIDAZOLAM HYDROCHLORIDE 1 MG/ML
INJECTION INTRAMUSCULAR; INTRAVENOUS PRN
Status: DISCONTINUED | OUTPATIENT
Start: 2023-09-12 | End: 2023-09-12 | Stop reason: SDUPTHER

## 2023-09-12 RX ORDER — MEPERIDINE HYDROCHLORIDE 25 MG/ML
12.5 INJECTION INTRAMUSCULAR; INTRAVENOUS; SUBCUTANEOUS EVERY 5 MIN PRN
Status: DISCONTINUED | OUTPATIENT
Start: 2023-09-12 | End: 2023-09-12 | Stop reason: HOSPADM

## 2023-09-12 RX ORDER — OXYCODONE HYDROCHLORIDE 5 MG/1
10 TABLET ORAL PRN
Status: DISCONTINUED | OUTPATIENT
Start: 2023-09-12 | End: 2023-09-12 | Stop reason: HOSPADM

## 2023-09-12 RX ORDER — FENTANYL CITRATE 0.05 MG/ML
50 INJECTION, SOLUTION INTRAMUSCULAR; INTRAVENOUS EVERY 5 MIN PRN
Status: DISCONTINUED | OUTPATIENT
Start: 2023-09-12 | End: 2023-09-12 | Stop reason: HOSPADM

## 2023-09-12 RX ORDER — SODIUM CHLORIDE 9 MG/ML
INJECTION, SOLUTION INTRAVENOUS PRN
Status: DISCONTINUED | OUTPATIENT
Start: 2023-09-12 | End: 2023-09-12 | Stop reason: HOSPADM

## 2023-09-12 RX ORDER — AMOXICILLIN AND CLAVULANATE POTASSIUM 875; 125 MG/1; MG/1
1 TABLET, FILM COATED ORAL 2 TIMES DAILY
Qty: 8 TABLET | Refills: 0 | Status: SHIPPED | OUTPATIENT
Start: 2023-09-12 | End: 2023-09-16

## 2023-09-12 RX ORDER — PROCHLORPERAZINE EDISYLATE 5 MG/ML
5 INJECTION INTRAMUSCULAR; INTRAVENOUS
Status: DISCONTINUED | OUTPATIENT
Start: 2023-09-12 | End: 2023-09-12 | Stop reason: HOSPADM

## 2023-09-12 RX ORDER — OXYCODONE HYDROCHLORIDE 5 MG/1
5 TABLET ORAL EVERY 6 HOURS PRN
Qty: 12 TABLET | Refills: 0 | Status: SHIPPED | OUTPATIENT
Start: 2023-09-12 | End: 2023-09-19 | Stop reason: SDUPTHER

## 2023-09-12 RX ORDER — SODIUM CHLORIDE, SODIUM LACTATE, POTASSIUM CHLORIDE, CALCIUM CHLORIDE 600; 310; 30; 20 MG/100ML; MG/100ML; MG/100ML; MG/100ML
INJECTION, SOLUTION INTRAVENOUS CONTINUOUS
Status: DISCONTINUED | OUTPATIENT
Start: 2023-09-12 | End: 2023-09-12 | Stop reason: HOSPADM

## 2023-09-12 RX ORDER — KETOROLAC TROMETHAMINE 30 MG/ML
INJECTION, SOLUTION INTRAMUSCULAR; INTRAVENOUS PRN
Status: DISCONTINUED | OUTPATIENT
Start: 2023-09-12 | End: 2023-09-12 | Stop reason: SDUPTHER

## 2023-09-12 RX ORDER — ROCURONIUM BROMIDE 10 MG/ML
INJECTION, SOLUTION INTRAVENOUS PRN
Status: DISCONTINUED | OUTPATIENT
Start: 2023-09-12 | End: 2023-09-12 | Stop reason: SDUPTHER

## 2023-09-12 RX ORDER — LIDOCAINE HYDROCHLORIDE 20 MG/ML
INJECTION, SOLUTION INTRAVENOUS PRN
Status: DISCONTINUED | OUTPATIENT
Start: 2023-09-12 | End: 2023-09-12 | Stop reason: SDUPTHER

## 2023-09-12 RX ORDER — SODIUM CHLORIDE 0.9 % (FLUSH) 0.9 %
5-40 SYRINGE (ML) INJECTION EVERY 12 HOURS SCHEDULED
Status: DISCONTINUED | OUTPATIENT
Start: 2023-09-12 | End: 2023-09-12 | Stop reason: HOSPADM

## 2023-09-12 RX ADMIN — FENTANYL CITRATE 50 MCG: 50 INJECTION INTRAMUSCULAR; INTRAVENOUS at 14:50

## 2023-09-12 RX ADMIN — SUGAMMADEX 200 MG: 100 INJECTION, SOLUTION INTRAVENOUS at 13:59

## 2023-09-12 RX ADMIN — FENTANYL CITRATE 25 MCG: 50 INJECTION, SOLUTION INTRAMUSCULAR; INTRAVENOUS at 14:02

## 2023-09-12 RX ADMIN — ROCURONIUM BROMIDE 10 MG: 10 INJECTION, SOLUTION INTRAVENOUS at 12:31

## 2023-09-12 RX ADMIN — SODIUM CHLORIDE, POTASSIUM CHLORIDE, SODIUM LACTATE AND CALCIUM CHLORIDE: 600; 310; 30; 20 INJECTION, SOLUTION INTRAVENOUS at 05:13

## 2023-09-12 RX ADMIN — ROCURONIUM BROMIDE 10 MG: 10 INJECTION, SOLUTION INTRAVENOUS at 12:55

## 2023-09-12 RX ADMIN — ONDANSETRON 4 MG: 2 INJECTION INTRAMUSCULAR; INTRAVENOUS at 14:45

## 2023-09-12 RX ADMIN — ROCURONIUM BROMIDE 50 MG: 10 INJECTION, SOLUTION INTRAVENOUS at 12:05

## 2023-09-12 RX ADMIN — PIPERACILLIN AND TAZOBACTAM 3375 MG: 3; .375 INJECTION, POWDER, LYOPHILIZED, FOR SOLUTION INTRAVENOUS at 02:43

## 2023-09-12 RX ADMIN — ONDANSETRON 4 MG: 2 INJECTION INTRAMUSCULAR; INTRAVENOUS at 13:46

## 2023-09-12 RX ADMIN — KETOROLAC TROMETHAMINE 30 MG: 30 INJECTION, SOLUTION INTRAMUSCULAR; INTRAVENOUS at 13:46

## 2023-09-12 RX ADMIN — PROPOFOL 200 MG: 10 INJECTION, EMULSION INTRAVENOUS at 12:04

## 2023-09-12 RX ADMIN — FENTANYL CITRATE 50 MCG: 50 INJECTION INTRAMUSCULAR; INTRAVENOUS at 15:09

## 2023-09-12 RX ADMIN — BUPIVACAINE HYDROCHLORIDE 50 ML: 5 INJECTION, SOLUTION EPIDURAL; INTRACAUDAL at 12:12

## 2023-09-12 RX ADMIN — HYDROMORPHONE HYDROCHLORIDE 0.5 MG: 1 INJECTION, SOLUTION INTRAMUSCULAR; INTRAVENOUS; SUBCUTANEOUS at 08:51

## 2023-09-12 RX ADMIN — SODIUM CHLORIDE, POTASSIUM CHLORIDE, SODIUM LACTATE AND CALCIUM CHLORIDE: 600; 310; 30; 20 INJECTION, SOLUTION INTRAVENOUS at 12:29

## 2023-09-12 RX ADMIN — PIPERACILLIN AND TAZOBACTAM 3375 MG: 3; .375 INJECTION, POWDER, LYOPHILIZED, FOR SOLUTION INTRAVENOUS at 11:00

## 2023-09-12 RX ADMIN — FENTANYL CITRATE 25 MCG: 50 INJECTION, SOLUTION INTRAMUSCULAR; INTRAVENOUS at 12:19

## 2023-09-12 RX ADMIN — ALPRAZOLAM 1 MG: 1 TABLET ORAL at 09:13

## 2023-09-12 RX ADMIN — DEXAMETHASONE SODIUM PHOSPHATE 10 MG: 10 INJECTION INTRAMUSCULAR; INTRAVENOUS at 12:10

## 2023-09-12 RX ADMIN — FENTANYL CITRATE 25 MCG: 50 INJECTION, SOLUTION INTRAMUSCULAR; INTRAVENOUS at 12:03

## 2023-09-12 RX ADMIN — FENTANYL CITRATE 25 MCG: 50 INJECTION, SOLUTION INTRAMUSCULAR; INTRAVENOUS at 14:00

## 2023-09-12 RX ADMIN — ROCURONIUM BROMIDE 10 MG: 10 INJECTION, SOLUTION INTRAVENOUS at 13:16

## 2023-09-12 RX ADMIN — MIDAZOLAM HYDROCHLORIDE 2 MG: 1 INJECTION, SOLUTION INTRAMUSCULAR; INTRAVENOUS at 11:59

## 2023-09-12 RX ADMIN — LIDOCAINE HYDROCHLORIDE 40 MG: 20 INJECTION, SOLUTION INTRAVENOUS at 12:04

## 2023-09-12 ASSESSMENT — PAIN DESCRIPTION - LOCATION
LOCATION: ABDOMEN

## 2023-09-12 ASSESSMENT — PAIN SCALES - GENERAL
PAINLEVEL_OUTOF10: 6
PAINLEVEL_OUTOF10: 6
PAINLEVEL_OUTOF10: 8
PAINLEVEL_OUTOF10: 2
PAINLEVEL_OUTOF10: 6
PAINLEVEL_OUTOF10: 7
PAINLEVEL_OUTOF10: 6

## 2023-09-12 ASSESSMENT — PAIN DESCRIPTION - DESCRIPTORS
DESCRIPTORS: ACHING;STABBING;PRESSURE
DESCRIPTORS: SORE

## 2023-09-12 ASSESSMENT — PAIN DESCRIPTION - ORIENTATION: ORIENTATION: MID

## 2023-09-12 NOTE — ANESTHESIA POSTPROCEDURE EVALUATION
Department of Anesthesiology  Postprocedure Note    Patient: Guillaume Cox  MRN: [id number]  YOB: 1985  Date of evaluation: 9/12/2023      Procedure Summary     Date: 09/12/23 Room / Location: [address]    Anesthesia Start: 1159 Anesthesia Stop: 1419    Procedure: CHOLECYSTECTOMY LAPAROSCOPIC (Abdomen) Diagnosis:       Acute cholecystitis      (Acute cholecystitis [K81.0])    Surgeons: Andrea Kitchen MD Responsible Provider: David Kolb MD    Anesthesia Type: general ASA Status: 2          Anesthesia Type: No value filed.     Amaury Phase I:      Amaury Phase II:        Anesthesia Post Evaluation    Patient location during evaluation: PACU  Patient participation: complete - patient participated  Level of consciousness: awake and alert  Airway patency: patent  Nausea & Vomiting: no nausea and no vomiting  Complications: no  Cardiovascular status: blood pressure returned to baseline and hemodynamically stable  Respiratory status: acceptable  Hydration status: euvolemic  Pain management: adequate

## 2023-09-12 NOTE — FLOWSHEET NOTE
Pt admitted to floor from ER. Assessment completed. VSS. Alert et oriented x4. Denies chest pain,sob or nausea. Oriented to room et call light. Family at bedside. Electronically signed by Marlene Ford RN on 9/11/2023 at 11:23 PM

## 2023-09-12 NOTE — H&P
Update History & Physical    The patient's History and Physical of September 11, 2023 was reviewed with the patient and I examined the patient. There was no change. The surgical site was confirmed by the patient and me. Plan: The risks, benefits, expected outcome, and alternative to the recommended procedure have been discussed with the patient. Patient understands and wants to proceed with the procedure.      Electronically signed by Deuce Park MD on 9/12/2023 at 11:46 AM

## 2023-09-12 NOTE — OP NOTE
Operative Note      Patient: Zenaida Skelton  YOB: 1985  MRN: [id number]    Date of Procedure: 9/12/2023    Pre-Op Diagnosis Codes:     * Acute cholecystitis [K81.0]    Post-Op Diagnosis: Same       Procedure(s):  CHOLECYSTECTOMY LAPAROSCOPIC    Surgeon(s):  Viktoria Soto MD    Assistant:   Physician Assistant: CARLOS Lo    Anesthesia: General    Estimated Blood Loss (mL): Minimal    Complications: None    Specimens:   ID Type Source Tests Collected by Time Destination   A : gallbladder Tissue Gallbladder SURGICAL PATHOLOGY Viktoria Soto MD 9/12/2023 1227        Implants:  * No implants in log *      Drains:   NG/OG/NJ/NE Tube Orogastric 25 fr Center mouth (Active)       Findings: Very inflamed gallbladder with multiple stones. +Stone spillage. Posterior wall of gallbladder fused to liver and remained in place. Detailed Description of Procedure: The patient was taken to the operating room after informed consent was obtained. The patient was placed in the supine position, and general endotracheal anesthesia was induced. All necessary support lines were placed. SCDs were on and functional prior to induction. Zosyn was given  for perioperative antibiotic prophylaxis. A time out was performed verifying the correct patient, procedure, site, positioning, and special equipment needed prior to the beginning of the procedure. The abdomen was prepped and draped in the usual sterile fashion. A 20mm vertical supraumbilical incision was made. The fascia was identified, and elevated with Kocher clamps and incised. Entry into the peritoneum was confirmed. Two anchoring sutures of 0 vicryl were placed  In the fascia and the Anderson cannula inserted under direct vision. The sutures were anchored around the cannula. The abdomen was insufflated to a pressure of 15mm Hg. The patient tolerated the insufflation well. The laparoscope was inserted and the abdomen inspected.  No injuries from initial trocar placement were noted. The patient was then positioned in reverse Trendelenburg position with right side up. Three 5mm ports were placed under direct visualization in the subxiphoid, and right upper quadrant. There were multiple adhesions between the omentum and gallbladder and liver. These were bluntly dissected. The dome of the gallbladder was then grasped with an atraumatic grasper passed through the lateral port and retracted over the dome of the liver. The infundibulum was also grasped with an atraumatic grasper through the midclavicular port and retracted towards the right lower quadrant. This maneuver exposed Calot's triangle. The peritoneum overlying the gallbladder infundibulum was then incised. The cystic duct was isolated then doubly clipped and divided close to the gallbladder. The cystic artery was not seen during the dissection. The gallbladder was then dissected from its peritoneal attachments by electrocautery. The posterior wall of the gallbladder was fused to the liver. The gallbladder was entered on multiple occasions and several large stones escaped. Hemostasis was verified and the gallbladder was removed using an endoscopic retrieval bag placed through the umbilical port. The stones were placed into an endocatch bag. The gallbladder fossa was irrigated. There was no evidence of bleeding from the gallbladder sara or cystic artery. Clips were securely in place across the cystic duct. The trocars were removed under direct visualization. The laparoscope was withdrawn and the umbilical trocar removed. The abdomen was allowed to collapse. The fascia at the supraumbilical incision was closed with the previously placed anchoring sutures as well as an additional 0-vicryl figure of eight suture. The skin was closed with subcuticular sutures of 4-0 Monocryl. They were dressed with Dermabond. Sponge, instrument and needle counts were correct at the end of the case.      The

## 2023-09-12 NOTE — PROGRESS NOTES
Received from or into pacu in a bed arousable to naem accompanied in by shobha Abdalla. O2l on at 8L mask, breath sounds clear to anterior auscultation, sao2 94%, Abdomen soft, incisions times four intact with dermabond. See assessment. Leonard Prince

## 2023-09-12 NOTE — ANESTHESIA PROCEDURE NOTES
Peripheral Block    Patient location during procedure: OR  Reason for block: post-op pain management and at surgeon's request  Start time: 9/12/2023 12:07 PM  End time: 9/12/2023 12:12 PM  Staffing  Performed: anesthesiologist   Anesthesiologist: Milton Ng MD  Performed by: Milton Ng MD  Authorized by: Milton Ng MD    Preanesthetic Checklist  Completed: patient identified, IV checked, site marked, risks and benefits discussed, surgical/procedural consents, equipment checked, pre-op evaluation, timeout performed, anesthesia consent given, oxygen available and monitors applied/VS acknowledged  Peripheral Block   Patient position: supine  Prep: ChloraPrep  Provider prep: mask and sterile gloves (Sterile probe cover)  Patient monitoring: cardiac monitor, continuous pulse ox, frequent blood pressure checks and IV access  Block type: TAP  Laterality: bilateral  Injection technique: single-shot  Guidance: nerve stimulator and ultrasound guided  Local infiltration: bupivacaine  Infiltration strength: 0.5 %  Local infiltration: bupivacaine  Dose: 50 mL    Needle   Needle type: combined needle/nerve stimulator   Needle gauge: 22 G  Needle localization: anatomical landmarks and ultrasound guidance  Needle length: 10 cm  Assessment   Injection assessment: negative aspiration for heme, no paresthesia on injection and local visualized surrounding nerve on ultrasound  Paresthesia pain: none  Slow fractionated injection: yes  Hemodynamics: stable  Real-time US image taken/store: yes  Outcomes: uncomplicated    Additional Notes  Ultrasound image printed and saved in patient chart.     Sterile probe cover used    25 mls given per side  Medications Administered  bupivacaine (MARCAINE) PF injection 0.5% - Perineural   50 mL - 9/12/2023 12:12:00 PM

## 2023-09-12 NOTE — DISCHARGE SUMMARY
[address] [facility], [address] [facility] Kapil Davis  MRN: [id number]  YOB: 1985  45 y.o.male      Attending  Ofelia Matute MD ?   Date of Admission  9/11/2023 Date of Discharge  9/12/2023      ? DIAGNOSES:  Principal Problem:    Acute cholecystitis  Resolved Problems:    * No resolved hospital problems. *         PROCEDURES:  9/12/2023 - Laparoscopic cholecystectomy  ? DISCHARGE MEDICATIONS:  Current Discharge Medication List             Details   amoxicillin-clavulanate (AUGMENTIN) 875-125 MG per tablet Take 1 tablet by mouth 2 times daily for 4 days  Qty: 8 tablet, Refills: 0      oxyCODONE (ROXICODONE) 5 MG immediate release tablet Take 1 tablet by mouth every 6 hours as needed for Pain for up to 3 days. Intended supply: 3 days. Take lowest dose possible to manage pain Max Daily Amount: 20 mg  Qty: 12 tablet, Refills: 0    Comments: Reduce doses taken as pain becomes manageable  Associated Diagnoses: Acute cholecystitis                Details   ALPRAZolam (XANAX) 1 MG tablet Take 1 tablet by mouth 3 times daily as needed for Sleep or Anxiety for up to 60 days.  Max Daily Amount: 3 mg  Qty: 90 tablet, Refills: 1    Associated Diagnoses: Anxiety               Current Facility-Administered Medications:     lactated ringers IV soln infusion, , IntraVENous, Continuous, Josh Lott MD, Last Rate: 125 mL/hr at 09/12/23 1621, Rate Verify at 09/12/23 1621    ALPRAZolam (XANAX) tablet 1 mg, 1 mg, Oral, TID PRN, Ofelia Matute MD, 1 mg at 09/12/23 0913    sodium chloride flush 0.9 % injection 5-40 mL, 5-40 mL, IntraVENous, 2 times per day, Ofelia Matute MD, 10 mL at 09/11/23 2228    sodium chloride flush 0.9 % injection 5-40 mL, 5-40 mL, IntraVENous, PRN, Ofelia Matute MD    0.9 % sodium chloride infusion, , IntraVENous, PRN, Ofelia Matute MD    ondansetron (ZOFRAN-ODT) disintegrating tablet 4 mg, 4 mg, Oral, Q8H PRN **OR** ondansetron Tender RUQ. Musculoskeletal: Good ROM in all extremities. No edema. Neurological: Alert, awake, and orientated x 3. Motor and sensory grossly intact. No focal deficits. GCS of 15. Skin: Warm, well perfused        ANTICIPATED FOLLOW UP:  Future Appointments   Date Time Provider [address]   9/19/2023  8:30 AM Rajiv Simental MD [address]   10/16/2023  3:45 PM Rajiv Simental MD [address] [place] [address]     No discharge procedures on file. Other indicated follow up and instructions for scheduling: Follow up with trauma 9/27      VTE RISK AT DISCHARGE:  none    --  Daniel Davidson MD  Trauma/Critical Care/ Emergency General Surgery    [see treatment team sticky note for contact information]      30 minutes were spent on the discharge of this patient including final examination of the patient, discussion of the hospital stay, instructions for continuing care to all relevant caregivers, preparation of discharge records, prescriptions and referral forms, and clear identification of reasons to return to clinic or to emergency room.

## 2023-09-12 NOTE — PLAN OF CARE
Problem: Discharge Planning  Goal: Discharge to home or other facility with appropriate resources  9/11/2023 2357 by Charlott Lanes, RN  Outcome: Progressing     Problem: Pain  Goal: Verbalizes/displays adequate comfort level or baseline comfort level  9/11/2023 2357 by Charlott Lanes, RN  Outcome: Progressing     Problem: Safety - Adult  Goal: Free from fall injury  Outcome: Progressing

## 2023-09-12 NOTE — CARE COORDINATION
Case Management Assessment  Initial Evaluation    Date/Time of Evaluation: 9/12/2023 4:11 PM  Assessment Completed by: Milton Johnson RN    If patient is discharged prior to next notation, then this note serves as note for discharge by case management. Patient Name: Marek Pinon                   YOB: 1985  Diagnosis: Acute cholecystitis [K81.0]  Cholecystitis [K81.9]                   Date / Time: 9/11/2023 10:51 AM    Patient Admission Status: Inpatient   Readmission Risk (Low < 19, Mod (19-27), High > 27): Readmission Risk Score: 4.8    Current PCP: Qing Bell MD  PCP verified by CM? Yes    Chart Reviewed: Yes      History Provided by: Patient  Patient Orientation: Alert and Oriented    Patient Cognition: Alert    Hospitalization in the last 30 days (Readmission):  No    If yes, Readmission Assessment in CM Navigator will be completed. Advance Directives:      Code Status: Full Code   Patient's Primary Decision Maker is: Named in Scanned ACP Document      Discharge Planning:    Patient lives with: Spouse/Significant Other Type of Home: Apartment  Primary Care Giver: Self  Patient Support Systems include: Other (Comment) (GF)   Current Financial resources: Medicaid  Current community resources: None  Current services prior to admission: None            Current DME:  N/A            Type of Home Care services:  None    ADLS  Prior functional level: Independent in ADLs/IADLs  Current functional level: Independent in ADLs/IADLs    PT AM-PAC:   /24  OT AM-PAC:   /24    Family can provide assistance at DC: Yes  Would you like Case Management to discuss the discharge plan with any other family members/significant others, and if so, who?  Yes  Plans to Return to Present Housing: Yes  Other Identified Issues/Barriers to RETURNING to current housing: MEDICAL CLEARANCE   Potential Assistance needed at discharge: N/A            Potential DME:  N/A  Patient expects to discharge to: [address] [place] [address]

## 2023-09-12 NOTE — PROGRESS NOTES
Fentanyl repeated fro continued complaint abdominal pain. States nausea \"is better now. \" Dr Nancy Carter in to see pt.

## 2023-09-13 ENCOUNTER — TELEPHONE (OUTPATIENT)
Dept: PRIMARY CARE CLINIC | Age: 38
End: 2023-09-13

## 2023-09-13 NOTE — TELEPHONE ENCOUNTER
Care Transitions Initial Follow Up Call    Outreach made within 2 business days of discharge: Yes    Patient: Adrián Fine Patient : 1985   MRN: [id number]  Reason for Admission: There are no discharge diagnoses documented for the most recent discharge. Discharge Date: 23       Spoke with: pt    Discharge department/facility: [facility] Interactive Patient Contact:  Was patient able to fill all prescriptions: Yes  Was patient instructed to bring all medications to the follow-up visit: Yes  Is patient taking all medications as directed in the discharge summary?  Yes  Does patient understand their discharge instructions: Yes  Does patient have questions or concerns that need addressed prior to 7-14 day follow up office visit: no    Scheduled appointment with PCP within 7-14 days    Follow Up  Future Appointments   Date Time Provider [address] [place] [address]   2023  8:30 AM Jose Elias Renteria   10/16/2023  3:45 PM Ariadna Chinchilla MD [address] [place] [address], [address] [place] [address]

## 2023-09-19 ENCOUNTER — OFFICE VISIT (OUTPATIENT)
Dept: PRIMARY CARE CLINIC | Age: 38
End: 2023-09-19
Payer: COMMERCIAL

## 2023-09-19 VITALS
TEMPERATURE: 98.1 F | SYSTOLIC BLOOD PRESSURE: 118 MMHG | BODY MASS INDEX: 39.62 KG/M2 | HEART RATE: 94 BPM | OXYGEN SATURATION: 99 % | DIASTOLIC BLOOD PRESSURE: 84 MMHG | WEIGHT: 283 LBS | HEIGHT: 71 IN

## 2023-09-19 DIAGNOSIS — R19.7 DIARRHEA, UNSPECIFIED TYPE: ICD-10-CM

## 2023-09-19 DIAGNOSIS — Z11.59 ENCOUNTER FOR HEPATITIS C SCREENING TEST FOR LOW RISK PATIENT: ICD-10-CM

## 2023-09-19 DIAGNOSIS — R11.0 NAUSEA: ICD-10-CM

## 2023-09-19 DIAGNOSIS — R10.10 PAIN OF UPPER ABDOMEN: ICD-10-CM

## 2023-09-19 DIAGNOSIS — R10.10 PAIN OF UPPER ABDOMEN: Primary | ICD-10-CM

## 2023-09-19 DIAGNOSIS — K81.0 ACUTE CHOLECYSTITIS: ICD-10-CM

## 2023-09-19 DIAGNOSIS — Z09 HOSPITAL DISCHARGE FOLLOW-UP: ICD-10-CM

## 2023-09-19 LAB
ALBUMIN SERPL-MCNC: 4.3 G/DL (ref 3.5–4.6)
ALP SERPL-CCNC: 191 U/L (ref 35–104)
ALT SERPL-CCNC: 48 U/L (ref 0–41)
ANION GAP SERPL CALCULATED.3IONS-SCNC: 16 MEQ/L (ref 9–15)
AST SERPL-CCNC: 20 U/L (ref 0–40)
BASOPHILS # BLD: 0.1 K/UL (ref 0–0.2)
BASOPHILS NFR BLD: 0.6 %
BILIRUB SERPL-MCNC: 0.3 MG/DL (ref 0.2–0.7)
BUN SERPL-MCNC: 17 MG/DL (ref 6–20)
CALCIUM SERPL-MCNC: 9.1 MG/DL (ref 8.5–9.9)
CHLORIDE SERPL-SCNC: 102 MEQ/L (ref 95–107)
CO2 SERPL-SCNC: 19 MEQ/L (ref 20–31)
CREAT SERPL-MCNC: 1.02 MG/DL (ref 0.7–1.2)
EOSINOPHIL # BLD: 0.2 K/UL (ref 0–0.7)
EOSINOPHIL NFR BLD: 2.4 %
ERYTHROCYTE [DISTWIDTH] IN BLOOD BY AUTOMATED COUNT: 13 % (ref 11.5–14.5)
GLOBULIN SER CALC-MCNC: 4 G/DL (ref 2.3–3.5)
GLUCOSE SERPL-MCNC: 103 MG/DL (ref 70–99)
HCT VFR BLD AUTO: 49.1 % (ref 42–52)
HGB BLD-MCNC: 16.4 G/DL (ref 14–18)
LYMPHOCYTES # BLD: 2.8 K/UL (ref 1–4.8)
LYMPHOCYTES NFR BLD: 32.3 %
MCH RBC QN AUTO: 31.4 PG (ref 27–31.3)
MCHC RBC AUTO-ENTMCNC: 33.4 % (ref 33–37)
MCV RBC AUTO: 93.9 FL (ref 79–92.2)
MONOCYTES # BLD: 0.7 K/UL (ref 0.2–0.8)
MONOCYTES NFR BLD: 7.6 %
NEUTROPHILS # BLD: 4.8 K/UL (ref 1.4–6.5)
NEUTS SEG NFR BLD: 55.8 %
PLATELET # BLD AUTO: 463 K/UL (ref 130–400)
POTASSIUM SERPL-SCNC: 4.7 MEQ/L (ref 3.4–4.9)
PROT SERPL-MCNC: 8.3 G/DL (ref 6.3–8)
RBC # BLD AUTO: 5.23 M/UL (ref 4.7–6.1)
SODIUM SERPL-SCNC: 137 MEQ/L (ref 135–144)
WBC # BLD AUTO: 8.7 K/UL (ref 4.8–10.8)

## 2023-09-19 PROCEDURE — 1111F DSCHRG MED/CURRENT MED MERGE: CPT | Performed by: INTERNAL MEDICINE

## 2023-09-19 PROCEDURE — 99215 OFFICE O/P EST HI 40 MIN: CPT | Performed by: INTERNAL MEDICINE

## 2023-09-19 RX ORDER — AMOXICILLIN 500 MG/1
500 CAPSULE ORAL 3 TIMES DAILY
Qty: 30 CAPSULE | Refills: 0 | Status: SHIPPED | OUTPATIENT
Start: 2023-09-19 | End: 2023-09-29

## 2023-09-19 RX ORDER — ONDANSETRON 4 MG/1
4 TABLET, ORALLY DISINTEGRATING ORAL 3 TIMES DAILY PRN
Qty: 30 TABLET | Refills: 0 | Status: SHIPPED | OUTPATIENT
Start: 2023-09-19

## 2023-09-19 RX ORDER — OXYCODONE HYDROCHLORIDE 5 MG/1
5 TABLET ORAL EVERY 6 HOURS PRN
Qty: 28 TABLET | Refills: 0 | Status: SHIPPED | OUTPATIENT
Start: 2023-09-19 | End: 2023-09-26

## 2023-09-19 ASSESSMENT — ENCOUNTER SYMPTOMS
ABDOMINAL PAIN: 1
PHOTOPHOBIA: 0
DIARRHEA: 1
ABDOMINAL DISTENTION: 0
NAUSEA: 1
VOMITING: 0
CHOKING: 0
TROUBLE SWALLOWING: 0
VOICE CHANGE: 0
SHORTNESS OF BREATH: 0

## 2023-09-19 NOTE — PROGRESS NOTES
Michel Pepe 45 y.o. male presents today with   Chief Complaint   Patient presents with    Follow-Up from Hospital     TCM  Admitted 9/11 CHOLECYSTECTOMY LAPAROSCOPIC  D/C 9/12    Pt states that he doesn't feel well \" dizziness\"       HPI 5 working days out of hospital. Adhesion of gallbladder wall to liver. Past Medical History:   Diagnosis Date    Anxiety     [place], [place].      Patient Active Problem List    Diagnosis Date Noted    Acute cholecystitis 09/11/2023    Acute hepatitis 04/22/2023    Depressive disorder 04/22/2023    Opioid dependence ([facility]) 04/22/2023    Vitamin D deficiency 04/22/2023     Past Surgical History:   Procedure Laterality Date    CHOLECYSTECTOMY, LAPAROSCOPIC N/A 9/12/2023    CHOLECYSTECTOMY LAPAROSCOPIC performed by Maico Carvajal MD at [address] EXTRACTION Bilateral      Family History   Problem Relation Age of Onset    Cancer Father     Diabetes Father     Alzheimer's Disease Maternal Grandmother     Heart Disease Maternal Grandfather      Social History     Socioeconomic History    Marital status: Single   Tobacco Use    Smoking status: Every Day     Packs/day: 1.00     Years: 15.00     Additional pack years: 0.00     Total pack years: 15.00     Types: Cigarettes    Smokeless tobacco: Never   Vaping Use    Vaping Use: Never used   Substance and Sexual Activity    Alcohol use: Not Currently    Drug use: Yes     Types: Marijuana Marijane Gonzalo)     Social Determinants of Health     Financial Resource Strain: Low Risk  (2/14/2023)    Overall Financial Resource Strain (CARDIA)     Difficulty of Paying Living Expenses: Not hard at all   Food Insecurity: No Food Insecurity (2/14/2023)    Hunger Vital Sign     Worried About Running Out of Food in the Last Year: Never true     Ran Out of Food in the Last Year: Never true   Transportation Needs: Unknown (2/14/2023)    PRAPARE - Transportation     Lack of Transportation (Non-Medical): No   Housing Stability:

## 2023-09-20 LAB — HEPATITIS C ANTIBODY: REACTIVE

## 2023-10-03 ENCOUNTER — OFFICE VISIT (OUTPATIENT)
Dept: PRIMARY CARE CLINIC | Age: 38
End: 2023-10-03
Payer: COMMERCIAL

## 2023-10-03 VITALS
HEART RATE: 92 BPM | OXYGEN SATURATION: 96 % | HEIGHT: 71 IN | SYSTOLIC BLOOD PRESSURE: 110 MMHG | DIASTOLIC BLOOD PRESSURE: 68 MMHG | WEIGHT: 281 LBS | BODY MASS INDEX: 39.34 KG/M2

## 2023-10-03 DIAGNOSIS — R19.7 DIARRHEA FOLLOWING GASTROINTESTINAL SURGERY: Primary | ICD-10-CM

## 2023-10-03 DIAGNOSIS — R19.7 DIARRHEA, UNSPECIFIED TYPE: ICD-10-CM

## 2023-10-03 DIAGNOSIS — F41.9 ANXIETY: ICD-10-CM

## 2023-10-03 DIAGNOSIS — Z98.890 DIARRHEA FOLLOWING GASTROINTESTINAL SURGERY: Primary | ICD-10-CM

## 2023-10-03 DIAGNOSIS — R10.10 PAIN OF UPPER ABDOMEN: ICD-10-CM

## 2023-10-03 PROCEDURE — G8427 DOCREV CUR MEDS BY ELIG CLIN: HCPCS | Performed by: INTERNAL MEDICINE

## 2023-10-03 PROCEDURE — G8484 FLU IMMUNIZE NO ADMIN: HCPCS | Performed by: INTERNAL MEDICINE

## 2023-10-03 PROCEDURE — 99214 OFFICE O/P EST MOD 30 MIN: CPT | Performed by: INTERNAL MEDICINE

## 2023-10-03 PROCEDURE — 4004F PT TOBACCO SCREEN RCVD TLK: CPT | Performed by: INTERNAL MEDICINE

## 2023-10-03 PROCEDURE — 1111F DSCHRG MED/CURRENT MED MERGE: CPT | Performed by: INTERNAL MEDICINE

## 2023-10-03 PROCEDURE — G8417 CALC BMI ABV UP PARAM F/U: HCPCS | Performed by: INTERNAL MEDICINE

## 2023-10-03 RX ORDER — ALPRAZOLAM 1 MG/1
1 TABLET ORAL 3 TIMES DAILY PRN
Qty: 90 TABLET | Refills: 2 | Status: SHIPPED | OUTPATIENT
Start: 2023-10-03 | End: 2023-10-09 | Stop reason: SDUPTHER

## 2023-10-03 RX ORDER — CHOLESTYRAMINE 4 G/9G
1 POWDER, FOR SUSPENSION ORAL 2 TIMES DAILY
Qty: 60 PACKET | Refills: 5 | Status: SHIPPED | OUTPATIENT
Start: 2023-10-03 | End: 2024-03-31

## 2023-10-03 NOTE — PROGRESS NOTES
Oliver Loja 45 y.o. male presents today with   Chief Complaint   Patient presents with    2 Week Follow-Up    Abdominal Pain     Nausea with diarrhea       Diarrhea   This is a new problem. The current episode started in the past 7 days. The problem occurs 2 to 4 times per day. The problem has been waxing and waning. The stool consistency is described as Watery. Associated symptoms include abdominal pain. Pertinent negatives include no bloating, chills, coughing, fever or vomiting. Anxiety  Presents for follow-up visit. Symptoms include irritability and nervous/anxious behavior. Patient reports no chest pain, nausea, palpitations, shortness of breath or suicidal ideas. Symptoms occur most days. Past Medical History:   Diagnosis Date    Anxiety     Jose Brooks.      Patient Active Problem List    Diagnosis Date Noted    Acute cholecystitis 09/11/2023    Acute hepatitis 04/22/2023    Depressive disorder 04/22/2023    Opioid dependence ([address] ) 04/22/2023    Vitamin D deficiency 04/22/2023     Past Surgical History:   Procedure Laterality Date    CHOLECYSTECTOMY, LAPAROSCOPIC N/A 9/12/2023    CHOLECYSTECTOMY LAPAROSCOPIC performed by Reynaldo Cali MD at [address] EXTRACTION Bilateral      Family History   Problem Relation Age of Onset    Cancer Father     Diabetes Father     Alzheimer's Disease Maternal Grandmother     Heart Disease Maternal Grandfather      Social History     Socioeconomic History    Marital status: Single     Spouse name: None    Number of children: None    Years of education: None    Highest education level: None   Tobacco Use    Smoking status: Every Day     Packs/day: 1.00     Years: 15.00     Additional pack years: 0.00     Total pack years: 15.00     Types: Cigarettes    Smokeless tobacco: Never   Vaping Use    Vaping Use: Never used   Substance and Sexual Activity    Alcohol use: Not Currently    Drug use: Yes     Types: Marijuana Orrie Harness)

## 2023-10-04 LAB — C DIFF TOX A+B STL QL IA: NORMAL

## 2023-10-09 ENCOUNTER — OFFICE VISIT (OUTPATIENT)
Dept: PRIMARY CARE CLINIC | Age: 38
End: 2023-10-09
Payer: COMMERCIAL

## 2023-10-09 VITALS
SYSTOLIC BLOOD PRESSURE: 116 MMHG | HEIGHT: 71 IN | RESPIRATION RATE: 18 BRPM | WEIGHT: 285.2 LBS | DIASTOLIC BLOOD PRESSURE: 70 MMHG | OXYGEN SATURATION: 99 % | HEART RATE: 95 BPM | BODY MASS INDEX: 39.93 KG/M2

## 2023-10-09 DIAGNOSIS — R19.7 DIARRHEA FOLLOWING GASTROINTESTINAL SURGERY: Primary | ICD-10-CM

## 2023-10-09 DIAGNOSIS — Z98.890 DIARRHEA FOLLOWING GASTROINTESTINAL SURGERY: Primary | ICD-10-CM

## 2023-10-09 DIAGNOSIS — F41.9 ANXIETY: ICD-10-CM

## 2023-10-09 PROCEDURE — 99213 OFFICE O/P EST LOW 20 MIN: CPT | Performed by: INTERNAL MEDICINE

## 2023-10-09 PROCEDURE — 1111F DSCHRG MED/CURRENT MED MERGE: CPT | Performed by: INTERNAL MEDICINE

## 2023-10-09 PROCEDURE — 4004F PT TOBACCO SCREEN RCVD TLK: CPT | Performed by: INTERNAL MEDICINE

## 2023-10-09 PROCEDURE — G8484 FLU IMMUNIZE NO ADMIN: HCPCS | Performed by: INTERNAL MEDICINE

## 2023-10-09 PROCEDURE — G8417 CALC BMI ABV UP PARAM F/U: HCPCS | Performed by: INTERNAL MEDICINE

## 2023-10-09 PROCEDURE — G8427 DOCREV CUR MEDS BY ELIG CLIN: HCPCS | Performed by: INTERNAL MEDICINE

## 2023-10-09 RX ORDER — ALPRAZOLAM 1 MG/1
1 TABLET ORAL 3 TIMES DAILY PRN
Qty: 90 TABLET | Refills: 2 | Status: SHIPPED | OUTPATIENT
Start: 2023-10-09 | End: 2024-01-07

## 2023-10-09 ASSESSMENT — ENCOUNTER SYMPTOMS
NAUSEA: 0
DIARRHEA: 1
SHORTNESS OF BREATH: 0
VOICE CHANGE: 0
TROUBLE SWALLOWING: 0
PHOTOPHOBIA: 0
TROUBLE SWALLOWING: 0
PHOTOPHOBIA: 0
VOICE CHANGE: 0
CHOKING: 0
COUGH: 0
NAUSEA: 0
BLOATING: 0
CHOKING: 0
VOMITING: 0
VOMITING: 0
SHORTNESS OF BREATH: 0
DIARRHEA: 1
ABDOMINAL PAIN: 1
ABDOMINAL PAIN: 0

## 2023-10-09 NOTE — PROGRESS NOTES
cranial nerve deficit. Psychiatric:         Mood and Affect: Mood normal.     Assessment/Plan  Marisel Adams was seen today for follow-up and diarrhea. Diagnoses and all orders for this visit:    Anxiety  -     ALPRAZolam (XANAX) 1 MG tablet; Take 1 tablet by mouth 3 times daily as needed for Sleep or Anxiety for up to 90 days. Max Daily Amount: 3 mg      Controlled Substances Monitoring: Periodic Controlled Substance Monitoring: Possible medication side effects, risk of tolerance/dependence & alternative treatments discussed., No signs of potential drug abuse or diversion identified. , Assessed functional status (ability to engage in work or other purposeful activities, the pain intensity and its interference with activities of daily living, quality of family life and social activities, and the physical activity) (Jaylen Jennings MD)    No follow-ups on file.     Jaylen Jennings MD

## 2023-12-04 NOTE — PROGRESS NOTES
Enoc Guzman 45 y.o. male presents today with   Chief Complaint   Patient presents with    Follow-up    Medication Refill       Anxiety  Presents for follow-up visit. Symptoms include decreased concentration, irritability and nervous/anxious behavior. Patient reports no chest pain, nausea, palpitations, shortness of breath or suicidal ideas. Symptoms occur most days. The severity of symptoms is severe. dr Kalin Yung. Has cancer.   He is on 2 mg qid    Past Medical History:   Diagnosis Date    Anxiety      Patient Active Problem List    Diagnosis Date Noted    Acute hepatitis 04/22/2023    Depressive disorder 04/22/2023    Opioid dependence ([facility] [address] 04/22/2023    Vitamin D deficiency 04/22/2023     Past Surgical History:   Procedure Laterality Date    FOOT SURGERY      WISDOM TOOTH EXTRACTION Bilateral      Family History   Problem Relation Age of Onset    Cancer Father     Diabetes Father     Alzheimer's Disease Maternal Grandmother     Heart Disease Maternal Grandfather      Social History     Socioeconomic History    Marital status: Single     Spouse name: None    Number of children: None    Years of education: None    Highest education level: None   Tobacco Use    Smoking status: Every Day     Packs/day: 1.00     Years: 15.00     Pack years: 15.00     Types: Cigarettes    Smokeless tobacco: Never   Vaping Use    Vaping Use: Never used   Substance and Sexual Activity    Alcohol use: Not Currently    Drug use: Yes     Types: Marijuana Nancy Chan     Social Determinants of Health     Financial Resource Strain: Low Risk     Difficulty of Paying Living Expenses: Not hard at all   Food Insecurity: No Food Insecurity    Worried About Running Out of Food in the Last Year: Never true    Ran Out of Food in the Last Year: Never true   Transportation Needs: Unknown    Lack of Transportation (Non-Medical): No   Housing Stability: Unknown    Unstable Housing in the Last Year: No     No Known Allergies    Review of no

## 2024-01-29 ENCOUNTER — OFFICE VISIT (OUTPATIENT)
Dept: PRIMARY CARE CLINIC | Age: 39
End: 2024-01-29
Payer: COMMERCIAL

## 2024-01-29 VITALS
BODY MASS INDEX: 40.17 KG/M2 | DIASTOLIC BLOOD PRESSURE: 100 MMHG | HEART RATE: 87 BPM | OXYGEN SATURATION: 97 % | WEIGHT: 288 LBS | SYSTOLIC BLOOD PRESSURE: 160 MMHG

## 2024-01-29 DIAGNOSIS — G47.00 INSOMNIA, UNSPECIFIED TYPE: ICD-10-CM

## 2024-01-29 DIAGNOSIS — F41.9 ANXIETY: Primary | ICD-10-CM

## 2024-01-29 DIAGNOSIS — J45.20 MILD INTERMITTENT ASTHMA WITHOUT COMPLICATION: ICD-10-CM

## 2024-01-29 PROCEDURE — G8427 DOCREV CUR MEDS BY ELIG CLIN: HCPCS | Performed by: INTERNAL MEDICINE

## 2024-01-29 PROCEDURE — 99213 OFFICE O/P EST LOW 20 MIN: CPT | Performed by: INTERNAL MEDICINE

## 2024-01-29 PROCEDURE — G8417 CALC BMI ABV UP PARAM F/U: HCPCS | Performed by: INTERNAL MEDICINE

## 2024-01-29 PROCEDURE — 4004F PT TOBACCO SCREEN RCVD TLK: CPT | Performed by: INTERNAL MEDICINE

## 2024-01-29 PROCEDURE — G8484 FLU IMMUNIZE NO ADMIN: HCPCS | Performed by: INTERNAL MEDICINE

## 2024-01-29 RX ORDER — ALBUTEROL SULFATE 90 UG/1
AEROSOL, METERED RESPIRATORY (INHALATION)
COMMUNITY
Start: 2023-12-14

## 2024-01-29 RX ORDER — ALPRAZOLAM 1 MG/1
1 TABLET ORAL NIGHTLY PRN
COMMUNITY
End: 2024-01-29 | Stop reason: SDUPTHER

## 2024-01-29 RX ORDER — ALPRAZOLAM 1 MG/1
1 TABLET ORAL 3 TIMES DAILY PRN
Qty: 90 TABLET | Refills: 2 | Status: SHIPPED | OUTPATIENT
Start: 2024-01-29 | End: 2024-04-28

## 2024-01-29 ASSESSMENT — PATIENT HEALTH QUESTIONNAIRE - PHQ9
SUM OF ALL RESPONSES TO PHQ QUESTIONS 1-9: 0
3. TROUBLE FALLING OR STAYING ASLEEP: 0
SUM OF ALL RESPONSES TO PHQ QUESTIONS 1-9: 0
4. FEELING TIRED OR HAVING LITTLE ENERGY: 0
6. FEELING BAD ABOUT YOURSELF - OR THAT YOU ARE A FAILURE OR HAVE LET YOURSELF OR YOUR FAMILY DOWN: 0
7. TROUBLE CONCENTRATING ON THINGS, SUCH AS READING THE NEWSPAPER OR WATCHING TELEVISION: 0
2. FEELING DOWN, DEPRESSED OR HOPELESS: 0
10. IF YOU CHECKED OFF ANY PROBLEMS, HOW DIFFICULT HAVE THESE PROBLEMS MADE IT FOR YOU TO DO YOUR WORK, TAKE CARE OF THINGS AT HOME, OR GET ALONG WITH OTHER PEOPLE: 0
SUM OF ALL RESPONSES TO PHQ9 QUESTIONS 1 & 2: 0
1. LITTLE INTEREST OR PLEASURE IN DOING THINGS: 0
8. MOVING OR SPEAKING SO SLOWLY THAT OTHER PEOPLE COULD HAVE NOTICED. OR THE OPPOSITE, BEING SO FIGETY OR RESTLESS THAT YOU HAVE BEEN MOVING AROUND A LOT MORE THAN USUAL: 0
5. POOR APPETITE OR OVEREATING: 0
9. THOUGHTS THAT YOU WOULD BE BETTER OFF DEAD, OR OF HURTING YOURSELF: 0
SUM OF ALL RESPONSES TO PHQ QUESTIONS 1-9: 0
SUM OF ALL RESPONSES TO PHQ QUESTIONS 1-9: 0

## 2024-01-29 ASSESSMENT — ENCOUNTER SYMPTOMS
CHOKING: 0
NAUSEA: 0
ABDOMINAL DISTENTION: 0
VOICE CHANGE: 0
PHOTOPHOBIA: 0
SHORTNESS OF BREATH: 0
VOMITING: 0
TROUBLE SWALLOWING: 0

## 2024-01-29 NOTE — PROGRESS NOTES
needed for Sleep or Anxiety for up to 90 days. Max Daily Amount: 3 mg    Mild intermittent asthma without complication        No follow-ups on file.    Fernando Mukherjee MD

## 2024-04-23 ENCOUNTER — OFFICE VISIT (OUTPATIENT)
Dept: PRIMARY CARE CLINIC | Age: 39
End: 2024-04-23
Payer: COMMERCIAL

## 2024-04-23 VITALS
WEIGHT: 289 LBS | DIASTOLIC BLOOD PRESSURE: 88 MMHG | SYSTOLIC BLOOD PRESSURE: 126 MMHG | HEART RATE: 78 BPM | BODY MASS INDEX: 40.31 KG/M2 | OXYGEN SATURATION: 98 %

## 2024-04-23 DIAGNOSIS — G47.00 INSOMNIA, UNSPECIFIED TYPE: ICD-10-CM

## 2024-04-23 DIAGNOSIS — F41.9 ANXIETY: ICD-10-CM

## 2024-04-23 PROCEDURE — G8417 CALC BMI ABV UP PARAM F/U: HCPCS | Performed by: INTERNAL MEDICINE

## 2024-04-23 PROCEDURE — G8427 DOCREV CUR MEDS BY ELIG CLIN: HCPCS | Performed by: INTERNAL MEDICINE

## 2024-04-23 PROCEDURE — 4004F PT TOBACCO SCREEN RCVD TLK: CPT | Performed by: INTERNAL MEDICINE

## 2024-04-23 PROCEDURE — 99213 OFFICE O/P EST LOW 20 MIN: CPT | Performed by: INTERNAL MEDICINE

## 2024-04-23 RX ORDER — ALPRAZOLAM 1 MG/1
1 TABLET ORAL 3 TIMES DAILY PRN
Qty: 90 TABLET | Refills: 2 | Status: SHIPPED | OUTPATIENT
Start: 2024-04-23 | End: 2024-07-22

## 2024-04-23 SDOH — ECONOMIC STABILITY: INCOME INSECURITY: HOW HARD IS IT FOR YOU TO PAY FOR THE VERY BASICS LIKE FOOD, HOUSING, MEDICAL CARE, AND HEATING?: HARD

## 2024-04-23 SDOH — ECONOMIC STABILITY: FOOD INSECURITY: WITHIN THE PAST 12 MONTHS, YOU WORRIED THAT YOUR FOOD WOULD RUN OUT BEFORE YOU GOT MONEY TO BUY MORE.: NEVER TRUE

## 2024-04-23 SDOH — ECONOMIC STABILITY: FOOD INSECURITY: WITHIN THE PAST 12 MONTHS, THE FOOD YOU BOUGHT JUST DIDN'T LAST AND YOU DIDN'T HAVE MONEY TO GET MORE.: OFTEN TRUE

## 2024-04-23 SDOH — ECONOMIC STABILITY: FOOD INSECURITY: WITHIN THE PAST 12 MONTHS, YOU WORRIED THAT YOUR FOOD WOULD RUN OUT BEFORE YOU GOT MONEY TO BUY MORE.: OFTEN TRUE

## 2024-04-23 SDOH — ECONOMIC STABILITY: FOOD INSECURITY: WITHIN THE PAST 12 MONTHS, THE FOOD YOU BOUGHT JUST DIDN'T LAST AND YOU DIDN'T HAVE MONEY TO GET MORE.: NEVER TRUE

## 2024-04-23 SDOH — ECONOMIC STABILITY: INCOME INSECURITY: HOW HARD IS IT FOR YOU TO PAY FOR THE VERY BASICS LIKE FOOD, HOUSING, MEDICAL CARE, AND HEATING?: NOT HARD AT ALL

## 2024-04-23 ASSESSMENT — ENCOUNTER SYMPTOMS
NAUSEA: 0
SHORTNESS OF BREATH: 0
TROUBLE SWALLOWING: 0
CHOKING: 0
VOMITING: 0
VOICE CHANGE: 0
PHOTOPHOBIA: 0

## 2024-04-23 NOTE — PROGRESS NOTES
Curt Raza 39 y.o. male presents today with   Chief Complaint   Patient presents with    3 Month Follow-Up    Anxiety    Medication Refill       Anxiety  Presents for follow-up visit. Symptoms include irritability and nervous/anxious behavior. Patient reports no chest pain, nausea, palpitations, shortness of breath or suicidal ideas. Symptoms occur most days. The severity of symptoms is moderate.        new job, car not working.    Past Medical History:   Diagnosis Date    Anxiety     [place], [place].     Patient Active Problem List    Diagnosis Date Noted    Acute cholecystitis 09/11/2023    Acute hepatitis 04/22/2023    Depressive disorder 04/22/2023    Opioid dependence ([facility]) 04/22/2023    Vitamin D deficiency 04/22/2023     Past Surgical History:   Procedure Laterality Date    CHOLECYSTECTOMY, LAPAROSCOPIC N/A 9/12/2023    CHOLECYSTECTOMY LAPAROSCOPIC performed by Truman Mayes MD at [facility] [place]    FOOT SURGERY      WISDOM TOOTH EXTRACTION Bilateral      Family History   Problem Relation Age of Onset    Cancer Father     Diabetes Father     Alzheimer's Disease Maternal Grandmother     Heart Disease Maternal Grandfather      Social History     Socioeconomic History    Marital status: Single     Spouse name: None    Number of children: None    Years of education: None    Highest education level: None   Tobacco Use    Smoking status: Every Day     Current packs/day: 1.00     Average packs/day: 1 pack/day for 15.0 years (15.0 ttl pk-yrs)     Types: Cigarettes    Smokeless tobacco: Never   Vaping Use    Vaping Use: Never used   Substance and Sexual Activity    Alcohol use: Not Currently    Drug use: Yes     Types: Marijuana (Weed)     Social Determinants of Health     Financial Resource Strain: High Risk (4/23/2024)    Overall Financial Resource Strain (CARDIA)     Difficulty of Paying Living Expenses: Hard   Food Insecurity: Food Insecurity Present (4/23/2024)    Hunger Vital Sign     Worried About Running

## 2024-04-23 NOTE — PATIENT INSTRUCTIONS
[place] Transportation Resources*  (Call 211 if need more resources.)     [address] [facility]:  What they offer:  Transportation to [facility] resident’s, 24 hrs. a day, 7 days a week. You must call for pricing and private pay by credit card.   Phone Number: [phone]  Website: https://www.[facility] Memorial HospitalAnShuo Information Technology.[employer]/transportationoptions    [facility] Transportation:  What they offer: Personal care service to airports, corporate concerts, shopping malls and more. Hours are based on customer’s needs and cost depends on riders’ location and where they are going.  Phone Number: 348.511.9796  Website: http://www.[employer]    [facility] Transportation:  What they offer: An unassisted curb to curb service for [facility] older adult residents. Rides to [facility] activities, grocery shopping, pharmacies, banking & beauty salons in [facility], area malls and voting.  Cost is free.   Phone Number: 941.711.4829  Website: https://www.[employer]/159/Senior-Center    [facility] Community Transportation:  What they offer:  Free transportation on Monday’s, Tuesday’s Wednesday’s and Thursdays for local shopping, banking, and medical appointments to [place] residents.   Phone Number: 155.188.9155  Website: https://www.[employer]/city-services/community-transportation    [facility]- Office on aging:  What they offer:   Transportation for LTAC, located within [facility] residents ages 60 and over to grocery & drug stores, hair salons, doctors, and dentist appointments. Transportation on Monday, Tuesday, and Saturday from 9am to noon, Thursday, and Friday from 9 am to 4 pm. Wednesday is social trips only. Local trips are $0.50 each way and social trips are $8.00 to $30.00 depending on the trip.   Phone Number: 383.490.3755 EXT 5  Website: https://www.[employer]/Faq.aspx?QID=70    [employer]:  What they offer:  Transportation for aging and disabled individuals for dialysis, medical &

## 2024-07-08 DIAGNOSIS — G47.00 INSOMNIA, UNSPECIFIED TYPE: ICD-10-CM

## 2024-07-08 DIAGNOSIS — F41.9 ANXIETY: ICD-10-CM

## 2024-07-08 RX ORDER — ALPRAZOLAM 1 MG/1
1 TABLET ORAL 3 TIMES DAILY PRN
Qty: 42 TABLET | Refills: 0 | Status: SHIPPED | OUTPATIENT
Start: 2024-07-08 | End: 2024-07-22

## 2024-07-18 ENCOUNTER — OFFICE VISIT (OUTPATIENT)
Dept: PRIMARY CARE CLINIC | Age: 39
End: 2024-07-18
Payer: COMMERCIAL

## 2024-07-18 VITALS
BODY MASS INDEX: 38.22 KG/M2 | SYSTOLIC BLOOD PRESSURE: 130 MMHG | OXYGEN SATURATION: 98 % | HEART RATE: 85 BPM | WEIGHT: 273 LBS | HEIGHT: 71 IN | DIASTOLIC BLOOD PRESSURE: 86 MMHG

## 2024-07-18 DIAGNOSIS — Z51.81 THERAPEUTIC DRUG MONITORING: ICD-10-CM

## 2024-07-18 DIAGNOSIS — G47.00 INSOMNIA, UNSPECIFIED TYPE: ICD-10-CM

## 2024-07-18 DIAGNOSIS — F43.10 PTSD (POST-TRAUMATIC STRESS DISORDER): ICD-10-CM

## 2024-07-18 DIAGNOSIS — F41.9 ANXIETY: Primary | ICD-10-CM

## 2024-07-18 PROCEDURE — 4004F PT TOBACCO SCREEN RCVD TLK: CPT | Performed by: INTERNAL MEDICINE

## 2024-07-18 PROCEDURE — G8427 DOCREV CUR MEDS BY ELIG CLIN: HCPCS | Performed by: INTERNAL MEDICINE

## 2024-07-18 PROCEDURE — 99214 OFFICE O/P EST MOD 30 MIN: CPT | Performed by: INTERNAL MEDICINE

## 2024-07-18 PROCEDURE — G8417 CALC BMI ABV UP PARAM F/U: HCPCS | Performed by: INTERNAL MEDICINE

## 2024-07-18 RX ORDER — ALPRAZOLAM 1 MG/1
1 TABLET ORAL 3 TIMES DAILY PRN
Qty: 90 TABLET | Refills: 2 | Status: SHIPPED | OUTPATIENT
Start: 2024-07-18 | End: 2024-10-16

## 2024-07-18 NOTE — PROGRESS NOTES
General: Normal range of motion.      Cervical back: Normal range of motion.   Skin:     Coloration: Skin is not jaundiced.   Neurological:      Mental Status: He is alert.   Psychiatric:         Mood and Affect: Mood normal.         Assessment/Plan  Curt was seen today for 3 month follow-up, anxiety, insomnia and medication refill.    Diagnoses and all orders for this visit:    Anxiety  -     ALPRAZolam (XANAX) 1 MG tablet; Take 1 tablet by mouth 3 times daily as needed for Sleep or Anxiety for up to 90 days. Max Daily Amount: 3 mg  -     Kory Martin Psychiatry, MD, [facility] [place]    Insomnia, unspecified type  -     ALPRAZolam (XANAX) 1 MG tablet; Take 1 tablet by mouth 3 times daily as needed for Sleep or Anxiety for up to 90 days. Max Daily Amount: 3 mg  -     Kory Martin Psychiatry, MD, [facility] [place]    PTSD (post-traumatic stress disorder)  -     Kory Martin Psychiatry, MD, [facility] [place]    Therapeutic drug monitoring  -     Pain Management Drug Screen; Future        No follow-ups on file.    Fernando Mukherjee MD

## 2024-07-24 LAB
6MAM UR QL: NOT DETECTED
7-AMINOCLONAZEPAM: NOT DETECTED
ALPHA-OH-ALPRAZOLAM: PRESENT
ALPHA-OH-MIDAZOLAM, URINE: NOT DETECTED
ALPRAZOLAM: PRESENT
AMPHET UR QL SCN: NOT DETECTED
BARBITURATES: NEGATIVE
BENZOYLECGONINE: NEGATIVE
BUPRENORPHINE: NOT DETECTED
CARISOPRODOL UR QL: NEGATIVE
CLONAZEPAM UR QL: NOT DETECTED
CODEINE: NOT DETECTED
CREAT UR-MCNC: 217.3 MG/DL (ref 20–400)
DIAZEPAM: NOT DETECTED
ETHYL GLUCURONIDE: NORMAL
FENTANYL UR QL: NOT DETECTED
GABAPENTIN: NOT DETECTED
HYDROCODONE UR QL: NOT DETECTED
HYDROMORPHONE: NOT DETECTED
LORAZEPAM UR QL: NOT DETECTED
MARIJUANA METABOLITE: NORMAL
MDA: NOT DETECTED
MDEA: NOT DETECTED
MDMA UR QL: NOT DETECTED
MEPERIDINE: NOT DETECTED
METHADONE: NEGATIVE
METHAMPHETAMINE: NOT DETECTED
METHYLPHENIDATE: NOT DETECTED
MIDAZOLAM UR QL SCN: NOT DETECTED
MORPHINE: NOT DETECTED
NALOXONE: NOT DETECTED
NORBUPRENORPHINE, FREE: NOT DETECTED
NORDIAZEPAM: NOT DETECTED
NORFENTANYL: NOT DETECTED
NORHYDROCODONE, URINE: NOT DETECTED
NOROXYCODONE: NOT DETECTED
NOROXYMORPHONE, URINE: NOT DETECTED
OXAZEPAM UR QL: NOT DETECTED
OXYCODONE UR QL: NOT DETECTED
OXYMORPHONE UR QL: NOT DETECTED
PAIN MANAGEMENT DRUG PANEL: NORMAL
PATHOLOGY STUDY: NORMAL
PCP: NEGATIVE
PHENTERMINE: NOT DETECTED
PREGABALIN: NOT DETECTED
TAPENTADOL, URINE: NOT DETECTED
TAPENTADOL-O-SULFATE, URINE: NOT DETECTED
TEMAZEPAM: NOT DETECTED
TRAMADOL: NEGATIVE
ZOLPIDEM: NOT DETECTED

## 2024-08-13 DIAGNOSIS — R19.7 DIARRHEA FOLLOWING GASTROINTESTINAL SURGERY: ICD-10-CM

## 2024-08-13 DIAGNOSIS — Z98.890 DIARRHEA FOLLOWING GASTROINTESTINAL SURGERY: ICD-10-CM

## 2024-08-13 NOTE — TELEPHONE ENCOUNTER
Rx request   Requested Prescriptions     Pending Prescriptions Disp Refills    cholestyramine (QUESTRAN) 4 g packet [Pharmacy Med Name: cholestyramine (with sugar) 4 gram powder for susp in a packet] 60 packet 5     Sig: DISSOLVE 1 PACKET in liquid and drink TWICE DAILY     LOV 7/18/2024  Next Visit Date:  No future appointments.

## 2024-08-14 RX ORDER — CHOLESTYRAMINE 4 G/9G
POWDER, FOR SUSPENSION ORAL
Qty: 60 PACKET | Refills: 5 | Status: SHIPPED | OUTPATIENT
Start: 2024-08-14

## 2024-10-14 ENCOUNTER — OFFICE VISIT (OUTPATIENT)
Dept: PRIMARY CARE CLINIC | Age: 39
End: 2024-10-14
Payer: COMMERCIAL

## 2024-10-14 VITALS
HEIGHT: 71 IN | OXYGEN SATURATION: 98 % | BODY MASS INDEX: 38.22 KG/M2 | DIASTOLIC BLOOD PRESSURE: 84 MMHG | SYSTOLIC BLOOD PRESSURE: 124 MMHG | HEART RATE: 76 BPM | WEIGHT: 273 LBS

## 2024-10-14 DIAGNOSIS — G47.00 INSOMNIA, UNSPECIFIED TYPE: ICD-10-CM

## 2024-10-14 DIAGNOSIS — F41.9 ANXIETY: ICD-10-CM

## 2024-10-14 PROBLEM — F11.20 OPIOID DEPENDENCE (HCC): Status: RESOLVED | Noted: 2023-04-22 | Resolved: 2024-10-14

## 2024-10-14 PROCEDURE — G8417 CALC BMI ABV UP PARAM F/U: HCPCS | Performed by: INTERNAL MEDICINE

## 2024-10-14 PROCEDURE — 99213 OFFICE O/P EST LOW 20 MIN: CPT | Performed by: INTERNAL MEDICINE

## 2024-10-14 PROCEDURE — G8484 FLU IMMUNIZE NO ADMIN: HCPCS | Performed by: INTERNAL MEDICINE

## 2024-10-14 PROCEDURE — 4004F PT TOBACCO SCREEN RCVD TLK: CPT | Performed by: INTERNAL MEDICINE

## 2024-10-14 PROCEDURE — G8427 DOCREV CUR MEDS BY ELIG CLIN: HCPCS | Performed by: INTERNAL MEDICINE

## 2024-10-14 RX ORDER — ALPRAZOLAM 0.5 MG
0.5 TABLET ORAL 4 TIMES DAILY PRN
Qty: 120 TABLET | Refills: 2 | Status: SHIPPED | OUTPATIENT
Start: 2024-10-14 | End: 2025-01-12

## 2024-10-14 ASSESSMENT — ENCOUNTER SYMPTOMS
SHORTNESS OF BREATH: 0
VOICE CHANGE: 0
CHOKING: 0
PHOTOPHOBIA: 0
VOMITING: 0
TROUBLE SWALLOWING: 0
NAUSEA: 0

## 2024-10-14 NOTE — PROGRESS NOTES
Curt Raza 39 y.o. male presents today with   Chief Complaint   Patient presents with    Anxiety     3 month follow up        Anxiety  Presents for follow-up visit. Symptoms include decreased concentration, insomnia, irritability and nervous/anxious behavior. Patient reports no chest pain, nausea, palpitations, shortness of breath or suicidal ideas. Symptoms occur most days. The severity of symptoms is moderate.       Insomnia  This is a recurrent problem. The current episode started more than 1 year ago. The problem occurs daily. The problem has been waxing and waning. Pertinent negatives include no chest pain, fatigue, fever, nausea, rash or vomiting.       Past Medical History:   Diagnosis Date    Anxiety     [place], [place].     Patient Active Problem List    Diagnosis Date Noted    Acute cholecystitis 09/11/2023    Acute hepatitis 04/22/2023    Depressive disorder 04/22/2023    Vitamin D deficiency 04/22/2023     Past Surgical History:   Procedure Laterality Date    CHOLECYSTECTOMY, LAPAROSCOPIC N/A 9/12/2023    CHOLECYSTECTOMY LAPAROSCOPIC performed by Truman Mayes MD at [facility] [place]    FOOT SURGERY      WISDOM TOOTH EXTRACTION Bilateral      Family History   Problem Relation Age of Onset    Cancer Father     Diabetes Father     Alzheimer's Disease Maternal Grandmother     Heart Disease Maternal Grandfather      Social History     Socioeconomic History    Marital status: Single     Spouse name: None    Number of children: None    Years of education: None    Highest education level: None   Tobacco Use    Smoking status: Every Day     Current packs/day: 1.00     Average packs/day: 1 pack/day for 15.0 years (15.0 ttl pk-yrs)     Types: Cigarettes    Smokeless tobacco: Never   Vaping Use    Vaping status: Never Used   Substance and Sexual Activity    Alcohol use: Not Currently    Drug use: Yes     Types: Marijuana (Weed)     Social Determinants of Health     Financial Resource Strain: High Risk

## 2024-12-29 DIAGNOSIS — F41.9 ANXIETY: ICD-10-CM

## 2024-12-29 DIAGNOSIS — G47.00 INSOMNIA, UNSPECIFIED TYPE: ICD-10-CM

## 2024-12-30 RX ORDER — ALPRAZOLAM 0.5 MG
0.5 TABLET ORAL 4 TIMES DAILY PRN
Qty: 120 TABLET | Refills: 0 | Status: SHIPPED | OUTPATIENT
Start: 2024-12-30 | End: 2025-03-30

## 2024-12-30 NOTE — TELEPHONE ENCOUNTER
Rx request   Requested Prescriptions     Pending Prescriptions Disp Refills    ALPRAZolam (XANAX) 0.5 MG tablet [Pharmacy Med Name: alprazolam 0.5 mg tablet] 120 tablet 2     Sig: Take 1 tablet by mouth 4 times daily as needed for Sleep or Anxiety for up to 90 days. Max Daily Amount: 2 mg     LOV 10/14/2024  Next Visit Date:  No future appointments.

## 2025-01-06 ENCOUNTER — OFFICE VISIT (OUTPATIENT)
Dept: PRIMARY CARE CLINIC | Age: 40
End: 2025-01-06
Payer: COMMERCIAL

## 2025-01-06 VITALS
HEIGHT: 71 IN | BODY MASS INDEX: 36.12 KG/M2 | HEART RATE: 95 BPM | WEIGHT: 258 LBS | DIASTOLIC BLOOD PRESSURE: 100 MMHG | OXYGEN SATURATION: 96 % | SYSTOLIC BLOOD PRESSURE: 160 MMHG

## 2025-01-06 DIAGNOSIS — J45.20 MILD INTERMITTENT ASTHMA WITHOUT COMPLICATION: ICD-10-CM

## 2025-01-06 DIAGNOSIS — G47.00 INSOMNIA, UNSPECIFIED TYPE: ICD-10-CM

## 2025-01-06 DIAGNOSIS — R19.7 DIARRHEA FOLLOWING GASTROINTESTINAL SURGERY: ICD-10-CM

## 2025-01-06 DIAGNOSIS — Z98.890 DIARRHEA FOLLOWING GASTROINTESTINAL SURGERY: ICD-10-CM

## 2025-01-06 DIAGNOSIS — F41.9 ANXIETY: Primary | ICD-10-CM

## 2025-01-06 PROCEDURE — G8417 CALC BMI ABV UP PARAM F/U: HCPCS | Performed by: INTERNAL MEDICINE

## 2025-01-06 PROCEDURE — G8427 DOCREV CUR MEDS BY ELIG CLIN: HCPCS | Performed by: INTERNAL MEDICINE

## 2025-01-06 PROCEDURE — 4004F PT TOBACCO SCREEN RCVD TLK: CPT | Performed by: INTERNAL MEDICINE

## 2025-01-06 PROCEDURE — 99214 OFFICE O/P EST MOD 30 MIN: CPT | Performed by: INTERNAL MEDICINE

## 2025-01-06 RX ORDER — CHOLESTYRAMINE 4 G/9G
1 POWDER, FOR SUSPENSION ORAL 2 TIMES DAILY
Qty: 60 PACKET | Refills: 5 | Status: SHIPPED | OUTPATIENT
Start: 2025-01-06

## 2025-01-06 RX ORDER — ALPRAZOLAM 0.5 MG
0.5 TABLET ORAL 4 TIMES DAILY PRN
Qty: 120 TABLET | Refills: 2 | Status: SHIPPED | OUTPATIENT
Start: 2025-01-06 | End: 2025-04-06

## 2025-01-06 ASSESSMENT — PATIENT HEALTH QUESTIONNAIRE - PHQ9
7. TROUBLE CONCENTRATING ON THINGS, SUCH AS READING THE NEWSPAPER OR WATCHING TELEVISION: NOT AT ALL
9. THOUGHTS THAT YOU WOULD BE BETTER OFF DEAD, OR OF HURTING YOURSELF: NOT AT ALL
10. IF YOU CHECKED OFF ANY PROBLEMS, HOW DIFFICULT HAVE THESE PROBLEMS MADE IT FOR YOU TO DO YOUR WORK, TAKE CARE OF THINGS AT HOME, OR GET ALONG WITH OTHER PEOPLE: NOT DIFFICULT AT ALL
SUM OF ALL RESPONSES TO PHQ QUESTIONS 1-9: 0
3. TROUBLE FALLING OR STAYING ASLEEP: NOT AT ALL
5. POOR APPETITE OR OVEREATING: NOT AT ALL
SUM OF ALL RESPONSES TO PHQ9 QUESTIONS 1 & 2: 0
1. LITTLE INTEREST OR PLEASURE IN DOING THINGS: NOT AT ALL
6. FEELING BAD ABOUT YOURSELF - OR THAT YOU ARE A FAILURE OR HAVE LET YOURSELF OR YOUR FAMILY DOWN: NOT AT ALL
SUM OF ALL RESPONSES TO PHQ QUESTIONS 1-9: 0
4. FEELING TIRED OR HAVING LITTLE ENERGY: NOT AT ALL
2. FEELING DOWN, DEPRESSED OR HOPELESS: NOT AT ALL
SUM OF ALL RESPONSES TO PHQ QUESTIONS 1-9: 0
8. MOVING OR SPEAKING SO SLOWLY THAT OTHER PEOPLE COULD HAVE NOTICED. OR THE OPPOSITE, BEING SO FIGETY OR RESTLESS THAT YOU HAVE BEEN MOVING AROUND A LOT MORE THAN USUAL: NOT AT ALL
SUM OF ALL RESPONSES TO PHQ QUESTIONS 1-9: 0

## 2025-01-06 NOTE — PROGRESS NOTES
Curt Raza 39 y.o. male presents today with   Chief Complaint   Patient presents with    3 Month Follow-Up    Anxiety    Medication Refill       Anxiety  Presents for follow-up visit. Symptoms include insomnia, irritability and nervous/anxious behavior. Patient reports no chest pain, nausea, palpitations, shortness of breath or suicidal ideas. Symptoms occur most days.     His past medical history is significant for asthma.   Insomnia  This is a recurrent problem. The current episode started more than 1 year ago. The problem occurs daily. The problem has been waxing and waning. Pertinent negatives include no chest pain, fatigue, fever, nausea, rash or vomiting.   Asthma  He complains of chest tightness and wheezing. There is no shortness of breath. This is a recurrent problem. The current episode started more than 1 year ago. The problem occurs intermittently. Pertinent negatives include no chest pain, fever or trouble swallowing. His past medical history is significant for asthma.   Diarrhea   This is a recurrent problem. The current episode started more than 1 year ago. The problem occurs less than 2 times per day. The problem has been waxing and waning. The stool consistency is described as Watery. Pertinent negatives include no fever or vomiting. Exacerbated by: after surgery.       Past Medical History:   Diagnosis Date    Anxiety     [place], [place].     Patient Active Problem List    Diagnosis Date Noted    Mild intermittent asthma without complication 01/06/2025    Acute cholecystitis 09/11/2023    Acute hepatitis 04/22/2023    Depressive disorder 04/22/2023    Vitamin D deficiency 04/22/2023     Past Surgical History:   Procedure Laterality Date    CHOLECYSTECTOMY, LAPAROSCOPIC N/A 9/12/2023    CHOLECYSTECTOMY LAPAROSCOPIC performed by [place] Mayes [place] [facility] [place]    FOOT SURGERY      WISDOM TOOTH EXTRACTION Bilateral      Family History   Problem Relation Age of Onset    Cancer Father

## 2025-04-25 ENCOUNTER — OFFICE VISIT (OUTPATIENT)
Dept: PRIMARY CARE CLINIC | Age: 40
End: 2025-04-25
Payer: COMMERCIAL

## 2025-04-25 VITALS
OXYGEN SATURATION: 98 % | DIASTOLIC BLOOD PRESSURE: 88 MMHG | WEIGHT: 245 LBS | HEART RATE: 103 BPM | BODY MASS INDEX: 34.3 KG/M2 | HEIGHT: 71 IN | SYSTOLIC BLOOD PRESSURE: 138 MMHG

## 2025-04-25 DIAGNOSIS — G47.00 INSOMNIA, UNSPECIFIED TYPE: ICD-10-CM

## 2025-04-25 DIAGNOSIS — F41.9 ANXIETY: Primary | ICD-10-CM

## 2025-04-25 PROCEDURE — G8417 CALC BMI ABV UP PARAM F/U: HCPCS | Performed by: INTERNAL MEDICINE

## 2025-04-25 PROCEDURE — 4004F PT TOBACCO SCREEN RCVD TLK: CPT | Performed by: INTERNAL MEDICINE

## 2025-04-25 PROCEDURE — G8427 DOCREV CUR MEDS BY ELIG CLIN: HCPCS | Performed by: INTERNAL MEDICINE

## 2025-04-25 PROCEDURE — 99214 OFFICE O/P EST MOD 30 MIN: CPT | Performed by: INTERNAL MEDICINE

## 2025-04-25 RX ORDER — ALPRAZOLAM 0.5 MG
TABLET ORAL
COMMUNITY
Start: 2025-03-30 | End: 2025-04-25 | Stop reason: SDUPTHER

## 2025-04-25 RX ORDER — ALPRAZOLAM 0.5 MG
0.5 TABLET ORAL 4 TIMES DAILY PRN
Qty: 120 TABLET | Refills: 2 | Status: SHIPPED | OUTPATIENT
Start: 2025-04-25 | End: 2025-07-24

## 2025-04-25 SDOH — ECONOMIC STABILITY: FOOD INSECURITY: WITHIN THE PAST 12 MONTHS, THE FOOD YOU BOUGHT JUST DIDN'T LAST AND YOU DIDN'T HAVE MONEY TO GET MORE.: NEVER TRUE

## 2025-04-25 SDOH — ECONOMIC STABILITY: FOOD INSECURITY: WITHIN THE PAST 12 MONTHS, YOU WORRIED THAT YOUR FOOD WOULD RUN OUT BEFORE YOU GOT MONEY TO BUY MORE.: NEVER TRUE

## 2025-04-25 ASSESSMENT — PATIENT HEALTH QUESTIONNAIRE - PHQ9
SUM OF ALL RESPONSES TO PHQ QUESTIONS 1-9: 0
4. FEELING TIRED OR HAVING LITTLE ENERGY: NOT AT ALL
5. POOR APPETITE OR OVEREATING: NOT AT ALL
6. FEELING BAD ABOUT YOURSELF - OR THAT YOU ARE A FAILURE OR HAVE LET YOURSELF OR YOUR FAMILY DOWN: NOT AT ALL
7. TROUBLE CONCENTRATING ON THINGS, SUCH AS READING THE NEWSPAPER OR WATCHING TELEVISION: NOT AT ALL
SUM OF ALL RESPONSES TO PHQ QUESTIONS 1-9: 0
9. THOUGHTS THAT YOU WOULD BE BETTER OFF DEAD, OR OF HURTING YOURSELF: NOT AT ALL
2. FEELING DOWN, DEPRESSED OR HOPELESS: NOT AT ALL
SUM OF ALL RESPONSES TO PHQ QUESTIONS 1-9: 0
1. LITTLE INTEREST OR PLEASURE IN DOING THINGS: NOT AT ALL
10. IF YOU CHECKED OFF ANY PROBLEMS, HOW DIFFICULT HAVE THESE PROBLEMS MADE IT FOR YOU TO DO YOUR WORK, TAKE CARE OF THINGS AT HOME, OR GET ALONG WITH OTHER PEOPLE: NOT DIFFICULT AT ALL
SUM OF ALL RESPONSES TO PHQ QUESTIONS 1-9: 0
8. MOVING OR SPEAKING SO SLOWLY THAT OTHER PEOPLE COULD HAVE NOTICED. OR THE OPPOSITE, BEING SO FIGETY OR RESTLESS THAT YOU HAVE BEEN MOVING AROUND A LOT MORE THAN USUAL: NOT AT ALL
3. TROUBLE FALLING OR STAYING ASLEEP: NOT AT ALL

## 2025-04-25 NOTE — PROGRESS NOTES
Curt Raza 40 y.o. male presents today with   Chief Complaint   Patient presents with    3 Month Follow-Up    Anxiety    Medication Refill       HPI    Past Medical History:   Diagnosis Date    Anxiety     [place], [place].    Smoker      Patient Active Problem List    Diagnosis Date Noted    Mild intermittent asthma without complication 01/06/2025    Acute cholecystitis 09/11/2023    Acute hepatitis 04/22/2023    Depressive disorder 04/22/2023    Vitamin D deficiency 04/22/2023     Past Surgical History:   Procedure Laterality Date    CHOLECYSTECTOMY, LAPAROSCOPIC N/A 9/12/2023    CHOLECYSTECTOMY LAPAROSCOPIC performed by Truman Mayes MD at [facility] [place]    FOOT SURGERY      WISDOM TOOTH EXTRACTION Bilateral      Family History   Problem Relation Age of Onset    Cancer Father     Diabetes Father     Alzheimer's Disease Maternal Grandmother     Heart Disease Maternal Grandfather      Social History     Socioeconomic History    Marital status: Single     Spouse name: None    Number of children: None    Years of education: None    Highest education level: None   Tobacco Use    Smoking status: Every Day     Current packs/day: 1.00     Average packs/day: 1 pack/day for 15.0 years (15.0 ttl pk-yrs)     Types: Cigarettes    Smokeless tobacco: Never   Vaping Use    Vaping status: Never Used   Substance and Sexual Activity    Alcohol use: Not Currently    Drug use: Yes     Types: Marijuana (Weed)     Social Drivers of Health     Financial Resource Strain: High Risk (4/23/2024)    Overall Financial Resource Strain (CARDIA)     Difficulty of Paying Living Expenses: Hard   Food Insecurity: No Food Insecurity (4/25/2025)    Hunger Vital Sign     Worried About Running Out of Food in the Last Year: Never true     Ran Out of Food in the Last Year: Never true   Transportation Needs: No Transportation Needs (4/25/2025)    PRAPARE - Transportation     Lack of Transportation (Medical): No     Lack of Transportation

## 2025-04-26 ASSESSMENT — ENCOUNTER SYMPTOMS
NAUSEA: 0
SHORTNESS OF BREATH: 0
VOICE CHANGE: 0
CHOKING: 0
PHOTOPHOBIA: 0
VOMITING: 0
TROUBLE SWALLOWING: 0

## 2025-04-26 NOTE — PROGRESS NOTES
Curt Raza 40 y.o. male presents today with   Chief Complaint   Patient presents with    3 Month Follow-Up    Anxiety    Medication Refill       Anxiety  Symptoms include nervous/anxious behavior. Patient reports no chest pain, nausea, palpitations, shortness of breath or suicidal ideas.       Medication Refill  Pertinent negatives include no chest pain, fatigue, fever, nausea, rash or vomiting.     History of Present Illness  The patient presents for evaluation of anxiety and smoking cessation.    The chief complaint is anxiety. He reports that his current medication, Xanax, continues to be effective in managing anxiety. Significant stress is experienced due to recent life changes, including the loss of all benefits following the start of a new job at [employer] and the financial burden of child support payments. His son is described as challenging, contributing to overall stress levels. Xanax has been utilized 4 times daily for several years, which is found beneficial. Additionally, a medical marijuana card is held and used primarily to aid sleep. Previous consultations with a psychiatrist and a counselor were not found helpful. Driving is not possible due to the inability to obtain a 's license. He is currently on probation. As a single parent, he is attempting to live independently without assistance, with rent costing $1100 per month. The work environment is reported to be stressful.    Smoking continues, although less than half a pack per day, a reduction from the previous habit of over a pack per day. This decrease is attributed to current employment, which does not permit smoking during work hours. No chest pain is reported. Efforts are being made to improve the diet, and weight loss has been noticed, particularly at work.    SOCIAL HISTORY  He smokes less than half a pack a day. He uses medical marijuana to help with sleep.       Past Medical History:   Diagnosis Date    Anxiety     [facility]

## 2025-06-24 LAB
ALBUMIN SERPL-MCNC: 4.6 G/DL (ref 3.5–4.6)
ALP SERPL-CCNC: 165 U/L (ref 35–104)
ALT SERPL-CCNC: 20 U/L (ref 0–41)
ANION GAP SERPL CALCULATED.3IONS-SCNC: 15 MEQ/L (ref 9–15)
AST SERPL-CCNC: 37 U/L (ref 0–40)
BASOPHILS # BLD: 0.1 K/UL (ref 0–0.2)
BASOPHILS NFR BLD: 1.1 %
BILIRUB DIRECT SERPL-MCNC: <0.1 MG/DL (ref 0–0.4)
BILIRUB INDIRECT SERPL-MCNC: ABNORMAL MG/DL (ref 0–0.6)
BILIRUB SERPL-MCNC: <0.2 MG/DL (ref 0.2–0.7)
BUN SERPL-MCNC: 18 MG/DL (ref 6–20)
CALCIUM SERPL-MCNC: 9.9 MG/DL (ref 8.5–9.9)
CHLORIDE SERPL-SCNC: 99 MEQ/L (ref 95–107)
CO2 SERPL-SCNC: 25 MEQ/L (ref 20–31)
CREAT SERPL-MCNC: 1.03 MG/DL (ref 0.7–1.2)
EOSINOPHIL # BLD: 0.3 K/UL (ref 0–0.7)
EOSINOPHIL NFR BLD: 3.3 %
ERYTHROCYTE [DISTWIDTH] IN BLOOD BY AUTOMATED COUNT: 12.6 % (ref 11.5–14.5)
GLOBULIN SER CALC-MCNC: 3.7 G/DL (ref 2.3–3.5)
GLUCOSE SERPL-MCNC: 70 MG/DL (ref 70–99)
HCT VFR BLD AUTO: 41.9 % (ref 42–52)
HGB BLD-MCNC: 14.3 G/DL (ref 14–18)
LYMPHOCYTES # BLD: 3.5 K/UL (ref 1–4.8)
LYMPHOCYTES NFR BLD: 39.6 %
MCH RBC QN AUTO: 30.6 PG (ref 27–31.3)
MCHC RBC AUTO-ENTMCNC: 34.1 % (ref 33–37)
MCV RBC AUTO: 89.7 FL (ref 79–92.2)
MONOCYTES # BLD: 0.5 K/UL (ref 0.2–0.8)
MONOCYTES NFR BLD: 6 %
NEUTROPHILS # BLD: 4.4 K/UL (ref 1.4–6.5)
NEUTS SEG NFR BLD: 49.5 %
PLATELET # BLD AUTO: 424 K/UL (ref 130–400)
POTASSIUM SERPL-SCNC: 3.8 MEQ/L (ref 3.4–4.9)
PROT SERPL-MCNC: 8.3 G/DL (ref 6.3–8)
RBC # BLD AUTO: 4.67 M/UL (ref 4.7–6.1)
REAGIN+T PALLIDUM IGG+IGM SERPL-IMP: NORMAL
SODIUM SERPL-SCNC: 139 MEQ/L (ref 135–144)
WBC # BLD AUTO: 8.8 K/UL (ref 4.8–10.8)

## 2025-06-25 LAB
HAV IGM SER IA-ACNC: NONREACTIVE
HEP B S AGB SURF AG: NONREACTIVE
HEPATITIS B CORE IGM ANTIBODY: NONREACTIVE
HEPATITIS C ANTIBODY: REACTIVE
HIV AG/AB: NONREACTIVE

## 2025-07-22 ENCOUNTER — OFFICE VISIT (OUTPATIENT)
Dept: PRIMARY CARE CLINIC | Age: 40
End: 2025-07-22
Payer: COMMERCIAL

## 2025-07-22 VITALS
SYSTOLIC BLOOD PRESSURE: 122 MMHG | HEIGHT: 71 IN | BODY MASS INDEX: 33.74 KG/M2 | DIASTOLIC BLOOD PRESSURE: 88 MMHG | WEIGHT: 241 LBS | HEART RATE: 88 BPM | OXYGEN SATURATION: 98 %

## 2025-07-22 DIAGNOSIS — F41.9 ANXIETY: ICD-10-CM

## 2025-07-22 DIAGNOSIS — G47.00 INSOMNIA, UNSPECIFIED TYPE: ICD-10-CM

## 2025-07-22 PROCEDURE — G8427 DOCREV CUR MEDS BY ELIG CLIN: HCPCS | Performed by: INTERNAL MEDICINE

## 2025-07-22 PROCEDURE — 99213 OFFICE O/P EST LOW 20 MIN: CPT | Performed by: INTERNAL MEDICINE

## 2025-07-22 PROCEDURE — 4004F PT TOBACCO SCREEN RCVD TLK: CPT | Performed by: INTERNAL MEDICINE

## 2025-07-22 PROCEDURE — G8417 CALC BMI ABV UP PARAM F/U: HCPCS | Performed by: INTERNAL MEDICINE

## 2025-07-22 RX ORDER — ALPRAZOLAM 0.5 MG
0.5 TABLET ORAL 4 TIMES DAILY PRN
Qty: 120 TABLET | Refills: 2 | Status: SHIPPED | OUTPATIENT
Start: 2025-07-22 | End: 2025-10-20

## 2025-07-22 ASSESSMENT — ENCOUNTER SYMPTOMS
CHOKING: 0
VOMITING: 0
VOICE CHANGE: 0
TROUBLE SWALLOWING: 0
NAUSEA: 0
PHOTOPHOBIA: 0
SHORTNESS OF BREATH: 0

## 2025-07-22 NOTE — PROGRESS NOTES
Curt Raza 40 y.o. male presents today with   Chief Complaint   Patient presents with    3 Month Follow-Up    Anxiety    Medication Refill       HPI  History of Present Illness  The patient presents for medication refills.    He reports that his anxiety has not improved and remains stable. He has been engaging in counseling sessions recently. He does not experience any depressive symptoms, primarily dealing with anxiety. Sleep initiation is challenging for him. His current regimen includes Xanax 0.5 mg, taken up to four times daily. He has made an effort to reduce his intake from 4 mg to 2 mg per day. His medication schedule varies; sometimes he takes one dose in the morning, another after work, and two at night. On other occasions, he may take one dose in the morning and three at night to aid sleep.    Sleep: He reports having a hard time going to sleep.       Past Medical History:   Diagnosis Date    Anxiety     [facility].    Smoker      Patient Active Problem List    Diagnosis Date Noted    Mild intermittent asthma without complication 01/06/2025    Acute cholecystitis 09/11/2023    Acute hepatitis 04/22/2023    Depressive disorder 04/22/2023    Vitamin D deficiency 04/22/2023     Past Surgical History:   Procedure Laterality Date    CHOLECYSTECTOMY, LAPAROSCOPIC N/A 9/12/2023    CHOLECYSTECTOMY LAPAROSCOPIC performed by Truman Mayes MD at [facility]    FOOT SURGERY      WISDOM TOOTH EXTRACTION Bilateral      Family History   Problem Relation Age of Onset    Cancer Father     Diabetes Father     Alzheimer's Disease Maternal Grandmother     Heart Disease Maternal Grandfather      Social History     Socioeconomic History    Marital status: Single     Spouse name: None    Number of children: None    Years of education: None    Highest education level: None   Tobacco Use    Smoking status: Every Day     Current packs/day: 1.00     Average packs/day: 1 pack/day for 15.0 years (15.0 ttl pk-yrs)

## (undated) DEVICE — TROCARS: Brand: KII® BALLOON BLUNT TIP SYSTEM

## (undated) DEVICE — TROCAR: Brand: KII® SLEEVE

## (undated) DEVICE — BLADE,CARBON-STEEL,11,STRL,DISPOSABLE,TB: Brand: MEDLINE

## (undated) DEVICE — SUTURE MCRYL SZ 4-0 L27IN ABSRB UD L19MM PS-2 1/2 CIR PRIM Y426H

## (undated) DEVICE — KIT,ANTI FOG,W/SPONGE & FLUID,SOFT PACK: Brand: MEDLINE

## (undated) DEVICE — Device

## (undated) DEVICE — APPLICATOR MEDICATED 26 CC SOLUTION HI LT ORNG CHLORAPREP

## (undated) DEVICE — DRAPE,LAP,CHOLE,W/TROUGHS,STERILE: Brand: MEDLINE

## (undated) DEVICE — TROCAR: Brand: KII SHIELDED BLADED ACCESS SYSTEM

## (undated) DEVICE — GOWN,AURORA,NONRNF,XL,30/CS: Brand: MEDLINE

## (undated) DEVICE — SINGLE PORT MANIFOLD: Brand: NEPTUNE 2

## (undated) DEVICE — WARMER SCP 2 ANTIFOG LAP DISP

## (undated) DEVICE — APPLIER CLP M/L SHFT DIA5MM 15 LIG LIGAMAX 5

## (undated) DEVICE — TISSUE RETRIEVAL SYSTEM: Brand: INZII RETRIEVAL SYSTEM

## (undated) DEVICE — GAUZE,SPONGE,4"X4",16PLY,XRAY,STRL,LF: Brand: MEDLINE

## (undated) DEVICE — GOWN,AURORA,NONREINFORCED,LARGE: Brand: MEDLINE

## (undated) DEVICE — GLOVE ORANGE PI 8   MSG9080

## (undated) DEVICE — ELECTRODE PT RET AD L9FT HI MOIST COND ADH HYDRGEL CORDED

## (undated) DEVICE — LABEL MED MINI W/ MARKER

## (undated) DEVICE — COUNTER NDL 40 COUNT HLD 70 FOAM BLK ADH W/ MAG

## (undated) DEVICE — PACK,BASIC: Brand: MEDLINE

## (undated) DEVICE — SUTURE SZ 0 27IN 5/8 CIR UR-6  TAPER PT VIOLET ABSRB VICRYL J603H

## (undated) DEVICE — TOWEL,OR,DSP,ST,BLUE,STD,4/PK,20PK/CS: Brand: MEDLINE

## (undated) DEVICE — TUBING INSUFFLATION SMK EVAC HI FLO SET PNEUMOCLEAR

## (undated) DEVICE — COVER LT HNDL BLU PLAS

## (undated) DEVICE — NEPTUNE E-SEP SMOKE EVACUATION PENCIL, COATED, 70MM BLADE, PUSH BUTTON SWITCH: Brand: NEPTUNE E-SEP

## (undated) DEVICE — LIQUIBAND RAPID ADHESIVE 36/CS 0.8ML: Brand: MEDLINE